# Patient Record
Sex: FEMALE | Race: WHITE | NOT HISPANIC OR LATINO | Employment: FULL TIME | URBAN - METROPOLITAN AREA
[De-identification: names, ages, dates, MRNs, and addresses within clinical notes are randomized per-mention and may not be internally consistent; named-entity substitution may affect disease eponyms.]

---

## 2017-12-18 ENCOUNTER — HOSPITAL ENCOUNTER (OUTPATIENT)
Dept: RADIOLOGY | Facility: MEDICAL CENTER | Age: 47
End: 2017-12-18
Attending: GENERAL PRACTICE
Payer: COMMERCIAL

## 2017-12-18 DIAGNOSIS — M79.645 PAIN OF FINGER OF LEFT HAND: ICD-10-CM

## 2017-12-18 PROCEDURE — 73140 X-RAY EXAM OF FINGER(S): CPT | Mod: LT

## 2018-07-13 ENCOUNTER — OFFICE VISIT (OUTPATIENT)
Dept: MEDICAL GROUP | Facility: PHYSICIAN GROUP | Age: 48
End: 2018-07-13
Payer: COMMERCIAL

## 2018-07-13 VITALS
BODY MASS INDEX: 20.56 KG/M2 | SYSTOLIC BLOOD PRESSURE: 110 MMHG | OXYGEN SATURATION: 96 % | HEART RATE: 55 BPM | TEMPERATURE: 97.6 F | DIASTOLIC BLOOD PRESSURE: 72 MMHG | WEIGHT: 131 LBS | RESPIRATION RATE: 18 BRPM | HEIGHT: 67 IN

## 2018-07-13 DIAGNOSIS — Z12.11 SCREENING FOR COLON CANCER: ICD-10-CM

## 2018-07-13 DIAGNOSIS — I10 ESSENTIAL HYPERTENSION: ICD-10-CM

## 2018-07-13 DIAGNOSIS — G43.909 MIGRAINE WITHOUT STATUS MIGRAINOSUS, NOT INTRACTABLE, UNSPECIFIED MIGRAINE TYPE: ICD-10-CM

## 2018-07-13 DIAGNOSIS — F51.01 PRIMARY INSOMNIA: ICD-10-CM

## 2018-07-13 PROCEDURE — 99203 OFFICE O/P NEW LOW 30 MIN: CPT | Mod: 25 | Performed by: FAMILY MEDICINE

## 2018-07-13 RX ORDER — MEDROXYPROGESTERONE ACETATE 150 MG/ML
150 INJECTION, SUSPENSION INTRAMUSCULAR ONCE
Qty: 1 ML | Refills: 0
Start: 2018-07-13 | End: 2018-07-13

## 2018-07-13 RX ORDER — MEDROXYPROGESTERONE ACETATE 150 MG/ML
150 INJECTION, SUSPENSION INTRAMUSCULAR ONCE
Status: COMPLETED | OUTPATIENT
Start: 2018-07-13 | End: 2018-07-13

## 2018-07-13 RX ORDER — ELETRIPTAN HYDROBROMIDE 40 MG/1
40 TABLET, FILM COATED ORAL
COMMUNITY
End: 2018-09-10 | Stop reason: SDUPTHER

## 2018-07-13 RX ADMIN — MEDROXYPROGESTERONE ACETATE 150 MG: 150 INJECTION, SUSPENSION INTRAMUSCULAR at 16:45

## 2018-07-13 ASSESSMENT — PATIENT HEALTH QUESTIONNAIRE - PHQ9: CLINICAL INTERPRETATION OF PHQ2 SCORE: 0

## 2018-07-13 NOTE — ASSESSMENT & PLAN NOTE
Has had problems getting to sleep since adolescence. Has been on Xanax x years. Wiling to be switched to something else. Experienced severe withdrawal sxs when he pills were in her lost baggage.

## 2018-07-13 NOTE — PROGRESS NOTES
Complaint: Refill medication     Subjective:     Vanessa Mcdaniel is a 48 y.o. female here today for her Depo-Provera shot. Used to be followed by Dr. Morales Dudley.  Migraines  On Depo-Provera and propranolol to prevent migraines.     Primary insomnia  Has had problems getting to sleep since adolescence. Has been on Xanax x years. Wiling to be switched to something else. Experienced severe withdrawal sxs when he pills were in her lost baggage.    Essential hypertension  On propranolol.     Works as . Single. No children.    Current medicines (including changes today)  Current Outpatient Prescriptions   Medication Sig Dispense Refill   • eletriptan (RELPAX) 40 MG tablet Take 40 mg by mouth Once PRN.     • alprazolam (XANAX) 1 MG TABS Take 2 mg by mouth every bedtime.     • propranolol (INDERAL) 80 MG TABS Take 80 mg by mouth 2 Times a Day.     • MedroxyPROGESTERone Acetate (DEPO-PROVERA IM) by Intramuscular route.       No current facility-administered medications for this visit.      She  has a past medical history of Hypertension; Insomnia; and Migraine.    Health Maintenance: UTD according to patient.      Allergies: Ceclor [cefaclor]    Current Outpatient Prescriptions Ordered in [facility]   Medication Sig Dispense Refill   • eletriptan (RELPAX) 40 MG tablet Take 40 mg by mouth Once PRN.     • alprazolam (XANAX) 1 MG TABS Take 2 mg by mouth every bedtime.     • propranolol (INDERAL) 80 MG TABS Take 80 mg by mouth 2 Times a Day.     • MedroxyPROGESTERone Acetate (DEPO-PROVERA IM) by Intramuscular route.       No current [facility]-ordered facility-administered medications on file.        Past Medical History:   Diagnosis Date   • Hypertension    • Insomnia    • Migraine        Past Surgical History:   Procedure Laterality Date   • ACL REPAIR Right    • MAMMOPLASTY AUGMENTATION Bilateral        Social History   Substance Use Topics   • Smoking status: Never Smoker   • Smokeless tobacco: Never Used   • Alcohol use 1.2  "oz/week     2 Glasses of wine per week       Social History     Social History Narrative   • No narrative on file       History reviewed. No pertinent family history.      ROS  Patient denies any fever, chills, unintentional weight gain/loss, fatigue, stroke symptoms, dizziness, headache, nasal congestion, sore-throat, cough, heartburn, chest pain, difficulty breathing, abdominal discomfort, diarrhea/constipation, burning with urination or frequency, joint or back pain, skin rashes, depression or anxiety.       Objective:     Blood pressure 110/72, pulse (!) 55, temperature 36.4 °C (97.6 °F), resp. rate 18, height 1.702 m (5' 7\"), weight 59.4 kg (131 lb), SpO2 96 %. Body mass index is 20.52 kg/m².   Physical Exam:  Constitutional: Alert, no distress.  Neck: Trachea midline, no masses, no thyromegaly. No cervical or supraclavicular lymphadenopathy  Respiratory: Unlabored respiratory effort, lungs clear to auscultation, no wheezes, no ronchi.  Cardiovascular: Normal S1, S2, no murmur, no extremity edema.  Psych: Alert and oriented x3, appropriate affect and mood.        Assessment and Plan:   The following treatment plan was discussed    1. Migraine without status migrainosus, not intractable, unspecified migraine type  Chronic problem, controlled on meds.  - medroxyPROGESTERone (DEPO-PROVERA) injection 150 mg; 1 mL by Intramuscular route Once.    2. Primary insomnia  Chronic problem. Pt agreeable to switch to clonazepam once near out of Xanax. Will need to sign CS agreement and get US.    3. Essential hypertension  Chronic problem. Controlled on meds.    Followup: Return in about 3 months (around 10/13/2018).    Please note that this dictation was created using voice recognition software. I have made every reasonable attempt to correct obvious errors, but I expect that there are errors of grammar and possibly content that I did not discover before finalizing the note.           "

## 2018-08-03 ENCOUNTER — OFFICE VISIT (OUTPATIENT)
Dept: MEDICAL GROUP | Facility: PHYSICIAN GROUP | Age: 48
End: 2018-08-03
Payer: COMMERCIAL

## 2018-08-03 VITALS
DIASTOLIC BLOOD PRESSURE: 64 MMHG | RESPIRATION RATE: 18 BRPM | OXYGEN SATURATION: 97 % | HEART RATE: 53 BPM | HEIGHT: 66 IN | SYSTOLIC BLOOD PRESSURE: 98 MMHG | BODY MASS INDEX: 21.44 KG/M2 | WEIGHT: 133.4 LBS | TEMPERATURE: 98.2 F

## 2018-08-03 DIAGNOSIS — F51.01 PRIMARY INSOMNIA: ICD-10-CM

## 2018-08-03 PROCEDURE — 99213 OFFICE O/P EST LOW 20 MIN: CPT | Performed by: FAMILY MEDICINE

## 2018-08-03 RX ORDER — LORAZEPAM 1 MG/1
1 TABLET ORAL EVERY 4 HOURS PRN
Qty: 30 TAB | Refills: 0 | Status: SHIPPED | OUTPATIENT
Start: 2018-08-03 | End: 2018-08-23

## 2018-08-03 NOTE — ASSESSMENT & PLAN NOTE
Here for sleep med/Has had insomnia since age of 5. Used to wake up, have to read. Has tried number meds including Belsera, Ambien, etc... Concerned about being groggy in AM.

## 2018-08-03 NOTE — PROGRESS NOTES
Complaint: Sleep med     Subjective:     Vanessa Mcdaniel is a 48 y.o. female here today for refill sleep med.    Primary insomnia  Here for sleep med/Has had insomnia since age of 5. Used to wake up, have to read. Has tried number meds including Belsera, Ambien, etc... Concerned about being groggy in AM.      No other concerns or complaints.    Current medicines (including changes today)  Current Outpatient Prescriptions   Medication Sig Dispense Refill   • LORazepam (ATIVAN) 1 MG Tab Take 1 Tab by mouth every four hours as needed (for sleep) for up to 30 days. 30 Tab 0   • eletriptan (RELPAX) 40 MG tablet Take 40 mg by mouth Once PRN.     • propranolol (INDERAL) 80 MG TABS Take 80 mg by mouth 2 Times a Day.     • MedroxyPROGESTERone Acetate (DEPO-PROVERA IM) by Intramuscular route.       No current facility-administered medications for this visit.      She  has a past medical history of Hypertension; Insomnia; and Migraine.    Health Maintenance: UTD      Allergies: Ceclor [cefaclor]    Current Outpatient Prescriptions Ordered in [facility]   Medication Sig Dispense Refill   • LORazepam (ATIVAN) 1 MG Tab Take 1 Tab by mouth every four hours as needed (for sleep) for up to 30 days. 30 Tab 0   • eletriptan (RELPAX) 40 MG tablet Take 40 mg by mouth Once PRN.     • propranolol (INDERAL) 80 MG TABS Take 80 mg by mouth 2 Times a Day.     • MedroxyPROGESTERone Acetate (DEPO-PROVERA IM) by Intramuscular route.       No current [facility]-ordered facility-administered medications on file.        Past Medical History:   Diagnosis Date   • Hypertension    • Insomnia    • Migraine        Past Surgical History:   Procedure Laterality Date   • ACL REPAIR Right    • MAMMOPLASTY AUGMENTATION Bilateral        Social History   Substance Use Topics   • Smoking status: Never Smoker   • Smokeless tobacco: Never Used   • Alcohol use 1.2 oz/week     2 Glasses of wine per week       Social History     Social History Narrative   • No narrative on file  "      History reviewed. No pertinent family history.      ROS   Patient denies any fever, chills, unintentional weight gain/loss, fatigue, stroke symptoms, dizziness, headache, nasal congestion, sore-throat, cough, heartburn, chest pain, difficulty breathing, abdominal discomfort, diarrhea/constipation, burning with urination or frequency, joint or back pain, skin rashes, depression or anxiety.       Objective:     Blood pressure (!) 98/64, pulse (!) 53, temperature 36.8 °C (98.2 °F), resp. rate 18, height 1.676 m (5' 6\"), weight 60.5 kg (133 lb 6.4 oz), SpO2 97 %. Body mass index is 21.53 kg/m².   Physical Exam:  Constitutional: Alert, no distress.  No formal exam  Psych: Alert and oriented x3, appropriate affect and mood.        Assessment and Plan:   The following treatment plan was discussed    1. Primary insomnia  Chronic problem, controlled on med. Patient to report back on effect. If tolerated, will Rx for 3 months at a time.  - CONTROLLED SUBSTANCE TREATMENT AGREEMENT  - [facility] PAIN MANAGEMENT SCREEN; Future  - LORazepam (ATIVAN) 1 MG Tab; Take 1 Tab by mouth every four hours as needed (for sleep) for up to 30 days.  Dispense: 30 Tab; Refill: 0      Followup: Return in about 3 months (around 11/3/2018).    Please note that this dictation was created using voice recognition software. I have made every reasonable attempt to correct obvious errors, but I expect that there are errors of grammar and possibly content that I did not discover before finalizing the note.           "

## 2018-08-09 ENCOUNTER — TELEPHONE (OUTPATIENT)
Dept: MEDICAL GROUP | Facility: CLINIC | Age: 48
End: 2018-08-09

## 2018-08-09 DIAGNOSIS — F51.01 PRIMARY INSOMNIA: ICD-10-CM

## 2018-08-21 ENCOUNTER — TELEPHONE (OUTPATIENT)
Dept: MEDICAL GROUP | Facility: PHYSICIAN GROUP | Age: 48
End: 2018-08-21

## 2018-08-21 NOTE — TELEPHONE ENCOUNTER
1. Caller Name: Vanessa Mcdaniel                                           Call Back Number: [phone] (home)         Patient approves a detailed voicemail message: yes    patient called requesting to up the dosage of her lorazepam from 1 mg to 3.5 mg. She stated that the 1 mg is not helping. She is supposed to see Dr. Gonzalez tomorrow 08/22/18 at 9:20am but decided to cancel her appointment. She stated that she just needs her medication to be increased.

## 2018-08-23 ENCOUNTER — OFFICE VISIT (OUTPATIENT)
Dept: MEDICAL GROUP | Facility: LAB | Age: 48
End: 2018-08-23
Payer: COMMERCIAL

## 2018-08-23 VITALS
WEIGHT: 133.6 LBS | RESPIRATION RATE: 18 BRPM | BODY MASS INDEX: 21.47 KG/M2 | HEIGHT: 66 IN | SYSTOLIC BLOOD PRESSURE: 112 MMHG | OXYGEN SATURATION: 96 % | TEMPERATURE: 98.4 F | HEART RATE: 68 BPM | DIASTOLIC BLOOD PRESSURE: 64 MMHG

## 2018-08-23 DIAGNOSIS — F51.01 PRIMARY INSOMNIA: ICD-10-CM

## 2018-08-23 PROCEDURE — 99203 OFFICE O/P NEW LOW 30 MIN: CPT | Performed by: INTERNAL MEDICINE

## 2018-08-23 RX ORDER — TRAZODONE HYDROCHLORIDE 50 MG/1
50 TABLET ORAL NIGHTLY PRN
Qty: 30 TAB | Refills: 3 | Status: SHIPPED | OUTPATIENT
Start: 2018-08-23 | End: 2018-08-23

## 2018-08-23 RX ORDER — TRAZODONE HYDROCHLORIDE 50 MG/1
50 TABLET ORAL NIGHTLY PRN
Qty: 30 TAB | Refills: 3 | Status: SHIPPED | OUTPATIENT
Start: 2018-08-23 | End: 2018-09-10

## 2018-08-23 RX ORDER — ALPRAZOLAM 1 MG/1
1 TABLET ORAL NIGHTLY PRN
Qty: 15 TAB | Refills: 0 | Status: SHIPPED | OUTPATIENT
Start: 2018-08-23 | End: 2018-09-10 | Stop reason: SDUPTHER

## 2018-08-23 NOTE — PROGRESS NOTES
Chief Complaint   Patient presents with   • Medication Refill     lorazepam from increasing the dose to 3.5 mg       Subjective:     History of Present Illness: Patient is a 48 y.o. female. This is a patient of Dr. Brady who is here today for an acute visit for insomnia.    Primary insomnia  New to me, chronic uncontrolled problem.  She told me that she has insomnia since she was 5 years old.  She usually goes to bed at 9 PM, tried to read on her denis is very boring book and then she will fall asleep and wakes up at 2 in the morning.  She has been taking lorazepam for 4 years and recently switched to Ativan 1 mg earlier this month.  She stated that she usually exercises in the evening by walking her dog, she cannot do relaxation or read books instead of electronics at that bedtime because she cannot go and turn the lights off.  She seems to be very frustrated for her insomnia because she has tried multiple medications in the past.  She has been happy with the level of control of her symptoms on Xanax but was recently switched to Ativan because of long-term benzodiazepine and plan to wean her off.  She told me that since she stopped taking Xanax for anxiety level has also increased.  I asked her if she has anxiety problems she denied at this time but apparently she does have an underlying anxiety issue that occurs above Xanax as well.  Upon further questioning, she stated that she try trazodone about 10 years ago but she cannot remember why she stopped it and what side effects she had from it.  She is willing to try it again.              Allergies: Ceclor [cefaclor]    Current Outpatient Prescriptions Ordered in [facility]   Medication Sig Dispense Refill   • ALPRAZolam (XANAX) 1 MG Tab Take 1 Tab by mouth at bedtime as needed for Sleep for up to 30 days. 15 Tab 0   • traZODone (DESYREL) 50 MG Tab Take 1 Tab by mouth at bedtime as needed for Sleep. 30 Tab 3   • eletriptan (RELPAX) 40 MG tablet Take 40 mg by mouth  Once PRN.     • propranolol (INDERAL) 80 MG TABS Take 80 mg by mouth 2 Times a Day.     • MedroxyPROGESTERone Acetate (DEPO-PROVERA IM) by Intramuscular route.       No current [facility]-ordered facility-administered medications on file.        Past Medical History:   Diagnosis Date   • Hypertension    • Insomnia    • Migraine        Past Surgical History:   Procedure Laterality Date   • ACL REPAIR Right    • MAMMOPLASTY AUGMENTATION Bilateral        Social History   Substance Use Topics   • Smoking status: Never Smoker   • Smokeless tobacco: Never Used   • Alcohol use 1.2 oz/week     2 Glasses of wine per week       History reviewed. No pertinent family history.    ROS:     - Constitutional: Negative for fever, chills, unexpected weight change, and fatigue/generalized weakness.     - HEENT: Negative for headaches, vision changes, hearing changes, ear pain, ear discharge, sinus congestion, sore throat, and neck pain.      - Respiratory: Negative for cough, sputum production, dyspnea and wheezing.    - Cardiovascular: Negative for chest pain, palpitations, orthopnea, and bilateral lower extremity edema.     - Gastrointestinal: Negative for heartburn, nausea, vomiting, abdominal pain, hematochezia, melena, diarrhea, constipation, and greasy/foul-smelling stools.     - Genitourinary: Negative for dysuria, polyuria, hematuria, pyuria, urinary urgency, and urinary incontinence.    - Musculoskeletal: Negative for myalgias, back pain, and joint pain.     - Skin: Negative for rash, itching, cyanotic skin color change.     - Neurological: Negative for dizziness, tingling, tremors, focal sensory deficit, focal weakness and headaches.     - Endo/Heme/Allergies: Does not bruise/bleed easily.     - Psychiatric/Behavioral: + insomnia. No SI.      - NOTE: All other systems reviewed and are negative, except as in HPI.       Physical Exam:     Blood pressure 112/64, pulse 68, temperature 36.9 °C (98.4 °F), resp. rate 18, height 1.676 m  "(5' 6\"), weight 60.6 kg (133 lb 9.6 oz), SpO2 96 %, not currently breastfeeding. Body mass index is 21.56 kg/m².   General: Normal appearing. No distress.  HEENT: Normocephalic. Eyes conjunctiva clear lids without ptosis, pupils equal and reactive to light accommodation, ears normal shape and contour, canals are clear bilaterally, tympanic membranes are benign,  oropharynx is without erythema, edema or exudates.    Neck: Supple without JVD or bruit. Thyroid is not enlarged.  Pulmonary: Clear to ausculation.  Normal effort. No rales, ronchi, or wheezing.  Cardiovascular: Regular rate and rhythm without murmur. Radial pulses are intact, regular and symmetrical bilaterally.  Abdomen: Soft, nontender, nondistended. Normal bowel sounds. Liver and spleen are not palpable.  Neurologic: Grossly non-focal.  Lymph: No cervical, occipital or supraclavicular lymph nodes are palpable  Skin: Warm and dry.  No obvious lesions.  Musculoskeletal: Normal gait. No extremity cyanosis, clubbing, or edema.  Psych: Normal mood and affect. Alert and oriented x3. Judgment and insight is normal.    Assessment and Plan:     1. Primary insomnia  New to me, chronic uncontrolled problem.  Chronic primary insomnia with benzodiazepine dependence.  Discussed her sleep hygiene, she is not open to change on her hygiene.  Has failed multiple medications in the past.  Failed a recent trial of Ativan 1 mg daily and had to take 2 and half milligrams per days so she ran out of her prescription.  I discussed with her the long-term benzodiazepine side effects and dependence potential.  I encouraged her to try trazodone 50 mg daily, start to increase her dose gradually to a maximum of 150 to achieve good sleep.  Will wean down Xanax, will give prescription for 1 mg every other day, total of 15 pills.  She will follow-up with her primary office in 2 weeks for reevaluation.    - ALPRAZolam (XANAX) 1 MG Tab; Take 1 Tab by mouth at bedtime as needed for Sleep " for up to 30 days.  Dispense: 15 Tab; Refill: 0  - traZODone (DESYREL) 50 MG Tab; Take 1 Tab by mouth at bedtime as needed for Sleep.  Dispense: 30 Tab; Refill: 3    Follow Up:      Return in about 2 weeks (around 9/6/2018) for Follow-up with primary for insomnia.    Please note that this dictation was created using voice recognition software. I have made every reasonable attempt to correct obvious errors, but I expect that there are errors of grammar and possibly content that I did not discover before finalizing the note.    Signed by: Rayna Knight M.D.

## 2018-08-23 NOTE — ASSESSMENT & PLAN NOTE
New to me, chronic uncontrolled problem.  She told me that she has insomnia since she was 5 years old.  She usually goes to bed at 9 PM, tried to read on her denis is very boring book and then she will fall asleep and wakes up at 2 in the morning.  She has been taking lorazepam for 4 years and recently switched to Ativan 1 mg earlier this month.  She stated that she usually exercises in the evening by walking her dog, she cannot do relaxation or read books instead of electronics at that bedtime because she cannot go and turn the lights off.  She seems to be very frustrated for her insomnia because she has tried multiple medications in the past.  She has been happy with the level of control of her symptoms on Xanax but was recently switched to Ativan because of long-term benzodiazepine and plan to wean her off.  She told me that since she stopped taking Xanax for anxiety level has also increased.  I asked her if she has anxiety problems she denied at this time but apparently she does have an underlying anxiety issue that occurs above Xanax as well.  Upon further questioning, she stated that she try trazodone about 10 years ago but she cannot remember why she stopped it and what side effects she had from it.  She is willing to try it again.

## 2018-09-10 ENCOUNTER — OFFICE VISIT (OUTPATIENT)
Dept: MEDICAL GROUP | Facility: PHYSICIAN GROUP | Age: 48
End: 2018-09-10
Payer: COMMERCIAL

## 2018-09-10 VITALS
HEART RATE: 55 BPM | HEIGHT: 66 IN | WEIGHT: 133 LBS | BODY MASS INDEX: 21.38 KG/M2 | RESPIRATION RATE: 14 BRPM | DIASTOLIC BLOOD PRESSURE: 82 MMHG | SYSTOLIC BLOOD PRESSURE: 118 MMHG | TEMPERATURE: 99.5 F | OXYGEN SATURATION: 96 %

## 2018-09-10 DIAGNOSIS — G43.909 MIGRAINE WITHOUT STATUS MIGRAINOSUS, NOT INTRACTABLE, UNSPECIFIED MIGRAINE TYPE: ICD-10-CM

## 2018-09-10 DIAGNOSIS — I10 ESSENTIAL HYPERTENSION: ICD-10-CM

## 2018-09-10 DIAGNOSIS — F51.01 PRIMARY INSOMNIA: ICD-10-CM

## 2018-09-10 PROCEDURE — 99214 OFFICE O/P EST MOD 30 MIN: CPT | Performed by: FAMILY MEDICINE

## 2018-09-10 RX ORDER — ALPRAZOLAM 1 MG/1
1 TABLET ORAL NIGHTLY PRN
Qty: 30 TAB | Refills: 0 | Status: SHIPPED | OUTPATIENT
Start: 2018-09-10 | End: 2018-09-10 | Stop reason: SDUPTHER

## 2018-09-10 RX ORDER — ELETRIPTAN HYDROBROMIDE 40 MG/1
40 TABLET, FILM COATED ORAL
Qty: 8 TAB | Refills: 2 | Status: SHIPPED | OUTPATIENT
Start: 2018-09-10 | End: 2018-10-10

## 2018-09-10 RX ORDER — ALPRAZOLAM 1 MG/1
1 TABLET ORAL NIGHTLY PRN
Qty: 30 TAB | Refills: 0 | Status: SHIPPED | OUTPATIENT
Start: 2018-10-10 | End: 2018-09-10 | Stop reason: SDUPTHER

## 2018-09-10 RX ORDER — ALPRAZOLAM 1 MG/1
1 TABLET ORAL NIGHTLY PRN
Qty: 30 TAB | Refills: 0 | Status: SHIPPED | OUTPATIENT
Start: 2018-11-09 | End: 2018-12-09

## 2018-09-10 NOTE — LETTER
[facility]  Tabatha Gonzalez M.D.  [address]  [facility] [address]  Fax: [phone]   Authorization for Release/Disclosure of   Protected Health Information   Name: VANESSA MCDANIEL : 1970 SSN: [id number]   Address: [address] [place] [address] [address] [place] [address] Phone:    [phone] (home)    I authorize the entity listed below to release/disclose the PHI below to:   [facility]/Tabatha Gonzalez M.D. and Tabatha Gonzalez M.D.   Provider or Entity Name:     Address   City, State, [facility]   Phone:      Fax:     Reason for request: continuity of care   Information to be released:    [  ] LAST COLONOSCOPY,  including any PATH REPORT and follow-up  [  ] LAST FIT/COLOGUARD RESULT [  ] LAST DEXA  [  ] LAST MAMMOGRAM  [  ] LAST PAP  [  ] LAST LABS [  ] RETINA EXAM REPORT  [  ] IMMUNIZATION RECORDS  [  ] Release all info      [  ] Check here and initial the line next to each item to release ALL health information INCLUDING  _____ Care and treatment for drug and / or alcohol abuse  _____ HIV testing, infection status, or AIDS  _____ Genetic Testing    DATES OF SERVICE OR TIME PERIOD TO BE DISCLOSED: _____________  I understand and acknowledge that:  * This Authorization may be revoked at any time by you in writing, except if your health information has already been used or disclosed.  * Your health information that will be used or disclosed as a result of you signing this authorization could be re-disclosed by the recipient. If this occurs, your re-disclosed health information may no longer be protected by State or Federal laws.  * You may refuse to sign this Authorization. Your refusal will not affect your ability to obtain treatment.  * This Authorization becomes effective upon signing and will  on (date) __________.      If no date is indicated, this Authorization will  one (1) year from the signature date.    Name: Vanessa Mcdaniel    Signature:   Date:     9/10/2018       PLEASE FAX  REQUESTED RECORDS BACK TO: [phone]

## 2018-09-10 NOTE — ASSESSMENT & PLAN NOTE
She was previously treated with botox injections by Dr. Dudley's office who is no longer her PCP.  She has food sensitivities and exercise induced migraines.  Botox cause migraines to resolve for 6 months.  Headaches have returned but respond to eletriptan which she needs refills of.  She is also on depo-provera which she has been on for many years.  She wishes to continue taking for now and is aware of the risk of osteoporosis.

## 2018-09-10 NOTE — ASSESSMENT & PLAN NOTE
She reports she has had insomnia since age 5.  She was started on Xanax 1 mg for 3-4 years and is the only medication that helps with sleep but does not make her drowsy during the days.  Trazodone, ambien, halcyon, lorazepam and all made her drowsy during the day and she could not sleep.  She was recently switched to lorazepam and did not do well on that and was started on trazodone.  She reports that she has never been diagnosed with anxiety.  She was seen by a sleep therapist and reports that the therapist said that only xanax will work for her.  She is set in her current sleep schedule and states that she cannot sleep without watching television on her tablet.

## 2018-09-11 NOTE — PROGRESS NOTES
CC: insomnia, headaches.    HISTORY OF PRESENT ILLNESS: Patient is a 48 y.o. female established patient who presents today to discuss the following    Health Maintenance: reviewed, flu declined    Primary insomnia  She reports she has had insomnia since age 5.  She was started on Xanax 1 mg for 3-4 years and is the only medication that helps with sleep but does not make her drowsy during the days.  Trazodone, ambien, halcyon, lorazepam and all made her drowsy during the day and she could not sleep.  She was recently switched to lorazepam and did not do well on that and was started on trazodone.  She reports that she has never been diagnosed with anxiety.  She was seen by a sleep therapist and reports that the therapist said that only xanax will work for her.  She is set in her current sleep schedule and states that she cannot sleep without watching television on her tablet.      Migraines  She was previously treated with botox injections by Dr. Dudley's office who is no longer her PCP.  She has food sensitivities and exercise induced migraines.  Botox cause migraines to resolve for 6 months.  Headaches have returned but respond to eletriptan which she needs refills of.  She is also on depo-provera which she has been on for many years.  She wishes to continue taking for now and is aware of the risk of osteoporosis.    Essential hypertension  This is managed on propranolol.  Her BP has been controlled over her last 3 visits.      Patient Active Problem List    Diagnosis Date Noted   • Migraines 07/13/2018   • Primary insomnia 07/13/2018   • Essential hypertension 07/13/2018      Allergies:Ceclor [cefaclor]    Current Outpatient Prescriptions   Medication Sig Dispense Refill   • eletriptan (RELPAX) 40 MG tablet Take 1 Tab by mouth Once PRN for up to 30 days. 8 Tab 2   • [START ON 11/9/2018] ALPRAZolam (XANAX) 1 MG Tab Take 1 Tab by mouth at bedtime as needed for Sleep for up to 30 days. 30 Tab 0   • propranolol  "(INDERAL) 80 MG TABS Take 80 mg by mouth 2 Times a Day.     • MedroxyPROGESTERone Acetate (DEPO-PROVERA IM) by Intramuscular route.       No current facility-administered medications for this visit.        Social History   Substance Use Topics   • Smoking status: Never Smoker   • Smokeless tobacco: Never Used   • Alcohol use 1.2 oz/week     2 Glasses of wine per week     Social History     Social History Narrative   • No narrative on file       Family History   Problem Relation Age of Onset   • Depression Mother        Review of Systems:      - Constitutional: Negative for fever, chills, unexpected weight change, and fatigue/generalized weakness.     - Neurological: Negative for dizziness, tingling, tremors, focal sensory deficit, focal weakness.     - Psychiatric/Behavioral: Negative for depression, suicidal/homicidal ideation and memory loss.          Exam:    Blood pressure 118/82, pulse (!) 55, temperature 37.5 °C (99.5 °F), resp. rate 14, height 1.676 m (5' 6\"), weight 60.3 kg (133 lb), SpO2 96 %. Body mass index is 21.47 kg/m².    General:  Well nourished, well developed female in NAD  Head is grossly normal.  Neck: Supple without JVD or bruit. Thyroid is not enlarged.  Pulmonary: Clear to ausculation and percussion.  Normal effort. No rales, ronchi, or wheezing.  Cardiovascular: Regular rate and rhythm without murmur. Carotid and radial pulses are intact and equal bilaterally.  Extremities: no clubbing, cyanosis, or edema.  Neuro: Normal Exam, Cranial nerves 2-12 intact, strength intact all four extremities, sensation intact to soft touch all four extremities, patellar and biceps brachii reflexes 2+ bilaterally, gait normal, Finger-to-nose is symmetric      Please note that this dictation was created using voice recognition software. I have made every reasonable attempt to correct obvious errors, but I expect that there are errors of grammar and possibly content that I did not discover before finalizing the " note.    Assessment/Plan:  1. Primary insomnia  She has long standing insomnia and uses xanax for sleep induction.  She has been using this for a few years and has not been able to wean it.  She feels somnolent on other medications the next day and is resistant to ongoing use of them.  She wishes to remain on Xanax.  The risks of xanax were reviewed, particularly the increased risk of dependence.  It is not a common insomnia medication.  I do suspect a degree of underlying anxiety.  For this reason she will need at least an evaluation with a specialist and likely ongoing follow up with them to manage this medication if it is indeed necessary. The records from the sleep therapist were requested and will need to be on file in the future.  Obtained and reviewed her patient utilization report from the [facility] database on 09/10/18 prior to writing prescriptions for controlled substances II, III, or IV per [place] bill .  A consent for the prescription of a controlled substance was signed.  Ongoing use of the medication is medically necessary at this time due to sleep, ongoing prescription will need to be by a specialist.    - ALPRAZolam (XANAX) 1 MG Tab; Take 1 Tab by mouth at bedtime as needed for Sleep for up to 30 days.  Dispense: 30 Tab; Refill: 0  - REFERRAL TO PSYCHIATRY  - Controlled Substance Treatment Agreement    2. Migraine without status migrainosus, not intractable, unspecified migraine type  She may continue to use relpax as needed, she is not overusing it.  She may see neurology to discuss botox.  She had a provider she would like to see and will let me know if a referral is needed and where she would like it sent.  - eletriptan (RELPAX) 40 MG tablet; Take 1 Tab by mouth Once PRN for up to 30 days.  Dispense: 8 Tab; Refill: 2    3. Essential hypertension  This is well controlled, continue current medications.

## 2018-09-14 ENCOUNTER — TELEPHONE (OUTPATIENT)
Dept: MEDICAL GROUP | Facility: PHYSICIAN GROUP | Age: 48
End: 2018-09-14

## 2018-09-14 NOTE — TELEPHONE ENCOUNTER
1. Caller Name: Vanessa                                       Call Back Number: [phone]      Patient approves a detailed voicemail message: yes    Medication issue:    There was another prescriber that prescribed trazadone to wing the patient off of the xanax. The pharmacy has flagged this medication, so they are not wanting to refill the Xanax for the patient due to the way the other provider has documented the medication he prescribed.    I am unable to pull an , but advised the patient I would contact the pharmacist. If need be.

## 2018-09-14 NOTE — TELEPHONE ENCOUNTER
On review of the , the last provider gave her 15 tabs of Xanax to last 30 days, this is probably why she can't fill it.  I am ok with the early refill this time if the pharmacy is able to bypass that.  Again, she will need a specialist to take over prescribing in the future.

## 2018-10-08 ENCOUNTER — NON-PROVIDER VISIT (OUTPATIENT)
Dept: MEDICAL GROUP | Facility: PHYSICIAN GROUP | Age: 48
End: 2018-10-08
Payer: COMMERCIAL

## 2018-10-08 DIAGNOSIS — F51.01 PRIMARY INSOMNIA: ICD-10-CM

## 2018-10-08 DIAGNOSIS — Z30.42 ENCOUNTER FOR SURVEILLANCE OF INJECTABLE CONTRACEPTIVE: ICD-10-CM

## 2018-10-08 PROBLEM — Z30.40 ENCOUNTER FOR SURVEILLANCE OF CONTRACEPTIVES: Status: ACTIVE | Noted: 2018-10-08

## 2018-10-08 PROCEDURE — 96372 THER/PROPH/DIAG INJ SC/IM: CPT | Performed by: FAMILY MEDICINE

## 2018-10-08 RX ORDER — MEDROXYPROGESTERONE ACETATE 150 MG/ML
150 INJECTION, SUSPENSION INTRAMUSCULAR ONCE
Status: COMPLETED | OUTPATIENT
Start: 2018-10-08 | End: 2018-10-08

## 2018-10-08 RX ADMIN — MEDROXYPROGESTERONE ACETATE 150 MG: 150 INJECTION, SUSPENSION INTRAMUSCULAR at 11:48

## 2018-10-08 NOTE — PROGRESS NOTES
Vanessa Mcdaniel is a 48 y.o. female here for a non-provider visit for Depo injection.    Reason for injection: Menapause  Order in MAR?: Yes  Patient supplied?:No  Minimum interval has been met for this injection (per MAR order): Yes    Order and dose verified by: Dr. Gonzalez  Patient tolerated injection and no adverse effects were observed or reported: No    # of Administrations remaining in MAR: 0

## 2018-10-16 DIAGNOSIS — G43.909 MIGRAINE WITHOUT STATUS MIGRAINOSUS, NOT INTRACTABLE, UNSPECIFIED MIGRAINE TYPE: ICD-10-CM

## 2018-10-16 NOTE — TELEPHONE ENCOUNTER
Was the patient seen in the last year in this department? Yes    Does patient have an active prescription for medications requested? Yes    Received Request Via: Patient        Last visit: 9/10/18  Last labs:10/1/2009

## 2018-10-17 RX ORDER — PROPRANOLOL HYDROCHLORIDE 80 MG/1
80 TABLET ORAL 2 TIMES DAILY
Qty: 180 TAB | Refills: 0 | Status: SHIPPED | OUTPATIENT
Start: 2018-10-17 | End: 2019-01-14 | Stop reason: SDUPTHER

## 2018-10-25 DIAGNOSIS — F51.01 PRIMARY INSOMNIA: ICD-10-CM

## 2019-02-25 ENCOUNTER — TELEPHONE (OUTPATIENT)
Dept: MEDICAL GROUP | Facility: PHYSICIAN GROUP | Age: 49
End: 2019-02-25

## 2019-02-26 NOTE — TELEPHONE ENCOUNTER
Pt called and left vm regarding the needing of rescheduling her depo injection. I called the patient and lvm advising the patient to call scheduling and schedule on Thurs afternoon or all day Friday.

## 2019-03-01 ENCOUNTER — NON-PROVIDER VISIT (OUTPATIENT)
Dept: MEDICAL GROUP | Facility: PHYSICIAN GROUP | Age: 49
End: 2019-03-01
Payer: COMMERCIAL

## 2019-03-01 DIAGNOSIS — Z30.9 ENCOUNTER FOR CONTRACEPTIVE MANAGEMENT, UNSPECIFIED TYPE: Primary | ICD-10-CM

## 2019-03-01 PROCEDURE — 96372 THER/PROPH/DIAG INJ SC/IM: CPT | Performed by: INTERNAL MEDICINE

## 2019-03-01 RX ORDER — MEDROXYPROGESTERONE ACETATE 150 MG/ML
150 INJECTION, SUSPENSION INTRAMUSCULAR ONCE
Status: COMPLETED | OUTPATIENT
Start: 2019-03-01 | End: 2019-03-01

## 2019-03-01 RX ADMIN — MEDROXYPROGESTERONE ACETATE 150 MG: 150 INJECTION, SUSPENSION INTRAMUSCULAR at 09:15

## 2019-03-01 NOTE — PROGRESS NOTES
Vanessa Mcdaniel is a 48 y.o. female here for a non-provider visit for Depo-Provera injection.    Reason for injection: Menopause   Order in MAR?: Yes  Patient supplied?:No  Minimum interval has been met for this injection (per MAR order): Yes    Order and dose verified by: Dr. Corley  Patient tolerated injection and no adverse effects were observed or reported: Yes

## 2019-03-08 ENCOUNTER — TELEPHONE (OUTPATIENT)
Dept: URGENT CARE | Facility: MEDICAL CENTER | Age: 49
End: 2019-03-08

## 2019-03-08 ENCOUNTER — OFFICE VISIT (OUTPATIENT)
Dept: URGENT CARE | Facility: MEDICAL CENTER | Age: 49
End: 2019-03-08
Payer: COMMERCIAL

## 2019-03-08 ENCOUNTER — HOSPITAL ENCOUNTER (OUTPATIENT)
Dept: RADIOLOGY | Facility: MEDICAL CENTER | Age: 49
End: 2019-03-08
Attending: NURSE PRACTITIONER
Payer: COMMERCIAL

## 2019-03-08 VITALS
HEART RATE: 54 BPM | TEMPERATURE: 99.4 F | DIASTOLIC BLOOD PRESSURE: 82 MMHG | SYSTOLIC BLOOD PRESSURE: 140 MMHG | HEIGHT: 66 IN | OXYGEN SATURATION: 97 % | BODY MASS INDEX: 20.73 KG/M2 | WEIGHT: 129 LBS

## 2019-03-08 DIAGNOSIS — R42 LIGHT HEADEDNESS: ICD-10-CM

## 2019-03-08 DIAGNOSIS — R00.1 BRADYCARDIA: ICD-10-CM

## 2019-03-08 DIAGNOSIS — R51.9 HEADACHE, UNSPECIFIED HEADACHE TYPE: ICD-10-CM

## 2019-03-08 DIAGNOSIS — G44.52 NEW DAILY PERSISTENT HEADACHE: ICD-10-CM

## 2019-03-08 PROCEDURE — 93000 ELECTROCARDIOGRAM COMPLETE: CPT | Performed by: NURSE PRACTITIONER

## 2019-03-08 PROCEDURE — 99214 OFFICE O/P EST MOD 30 MIN: CPT | Performed by: NURSE PRACTITIONER

## 2019-03-08 PROCEDURE — 70450 CT HEAD/BRAIN W/O DYE: CPT

## 2019-03-08 RX ORDER — ALPRAZOLAM 1 MG/1
TABLET ORAL
Refills: 0 | COMMUNITY
Start: 2019-02-04 | End: 2019-12-30

## 2019-03-08 RX ORDER — MINOCYCLINE HYDROCHLORIDE 100 MG/1
100 CAPSULE ORAL
Refills: 1 | COMMUNITY
Start: 2019-02-02 | End: 2024-02-15

## 2019-03-08 RX ORDER — LAMOTRIGINE 25 MG/1
TABLET ORAL
Refills: 0 | COMMUNITY
Start: 2019-01-15 | End: 2019-09-17

## 2019-03-08 ASSESSMENT — ENCOUNTER SYMPTOMS
FEVER: 0
CHILLS: 0
HEADACHES: 1
DIZZINESS: 1

## 2019-03-08 NOTE — PROGRESS NOTES
Subjective:      Vanessa Mcdaniel is a 48 y.o. female who presents with Blood Sugar Problem (153/93 / headache/ diziness X2 days)    Past Medical History:   Diagnosis Date   • Hypertension    • Insomnia    • Migraine      Social History     Social History   • Marital status: Single     Spouse name: N/A   • Number of children: N/A   • Years of education: N/A     Occupational History   • Not on file.     Social History Main Topics   • Smoking status: Never Smoker   • Smokeless tobacco: Never Used   • Alcohol use 1.2 oz/week     2 Glasses of wine per week   • Drug use: No   • Sexual activity: No     Other Topics Concern   • Not on file     Social History Narrative   • No narrative on file     Family History   Problem Relation Age of Onset   • Depression Mother        Allergies: Ceclor [cefaclor] and Doxycycline    Patient is 48-year-old female who presents today with complaint of vertex and frontal headache, and elevated blood pressure over the last week.  Patient has had a long-standing history of hypertension and states she has been taking beta-blocker propranolol 80 mg twice a day for the last 20 years.  States she has been very stable with this medication until recently.  No over-the-counter cough or cold medications.  Patient states she does wake up at night with headaches and states that last night she became very lightheaded.  That feeling has persisted today.  She denies chest pain or shortness of breath.          Other   The current episode started today. The problem occurs constantly. The problem has been unchanged. Associated symptoms include headaches. Pertinent negatives include no chills or fever. Nothing aggravates the symptoms. She has tried nothing for the symptoms. The treatment provided no relief.       Review of Systems   Constitutional: Positive for malaise/fatigue. Negative for chills and fever.   HENT: Negative.    Skin: Negative.    Neurological: Positive for dizziness and headaches.   All other  "systems reviewed and are negative.         Objective:     /82   Pulse (!) 54   Temp 37.4 °C (99.4 °F) (Temporal)   Ht 1.676 m (5' 6\")   Wt 58.5 kg (129 lb)   SpO2 97%   BMI 20.82 kg/m²      Physical Exam   Constitutional: She is oriented to person, place, and time. She appears well-developed and well-nourished.   HENT:   Head: Normocephalic.   Right Ear: External ear normal.   Left Ear: External ear normal.   Nose: Nose normal.   Mouth/Throat: Oropharynx is clear and moist.   Eyes: Pupils are equal, round, and reactive to light. Conjunctivae and EOM are normal.   Neck: Normal range of motion. Neck supple.   Cardiovascular: Normal rate, regular rhythm and normal heart sounds.    Pulmonary/Chest: Effort normal and breath sounds normal.   Musculoskeletal: Normal range of motion.   Neurological: She is alert and oriented to person, place, and time. She has normal strength. No cranial nerve deficit or sensory deficit. She displays a negative Romberg sign. GCS eye subscore is 4. GCS verbal subscore is 5. GCS motor subscore is 6.   Cranial nerves II through XII intact.  Cerebellar function intact.  Motor coordination intact.  Pupils equally round and reactive, EOMs intact.  Facial features symmetric with equal movement.  Speech is clear logical.  Shoulder shrug equal bilaterally.  Strength is 5/5 and equal in the upper and lower extremities.  Proprioception intact.  Romberg negative, no pronator drift.  Sensation intact.  Gait is even and steady.   Skin: Skin is warm and dry. Capillary refill takes less than 2 seconds.   Psychiatric: She has a normal mood and affect. Her behavior is normal. Judgment and thought content normal.   Vitals reviewed.    EKG: sinus bradycardia; no ST elevation or depression. Rate 45-47    CT head: pending     At this time I am concerned that patient's lightheadedness may be due to her low heart rate.  I did discuss going to ER with her as I am limited as to the workup I can do here " in the urgent care.  Patient declined to go to ER at this time.  We will refer patient to cardiology and obtain head CT because of the headache, with the understanding that she will go to ER for persistent or worsening of symptoms.  Patient verbalized understanding and agreement with plan of care.     Assessment/Plan:   Bradycardia  Light headedness    Referral to cardiology  Strict ER precautions for persistent symptoms  Head CT , will call results.  There are no diagnoses linked to this encounter.

## 2019-03-09 NOTE — TELEPHONE ENCOUNTER
Patient notified by phone of results of head CT.  CT scan is negative.  Reiterated ER precautions for continued or worsening lightheadedness.  Patient verbalized understanding and agreement, no further questions at this time.  Cardiology referral has been placed.

## 2019-03-11 ENCOUNTER — OFFICE VISIT (OUTPATIENT)
Dept: CARDIOLOGY | Facility: MEDICAL CENTER | Age: 49
End: 2019-03-11
Payer: COMMERCIAL

## 2019-03-11 VITALS
HEART RATE: 56 BPM | WEIGHT: 129 LBS | BODY MASS INDEX: 20.25 KG/M2 | DIASTOLIC BLOOD PRESSURE: 90 MMHG | SYSTOLIC BLOOD PRESSURE: 140 MMHG | HEIGHT: 67 IN

## 2019-03-11 DIAGNOSIS — R42 LIGHTHEADEDNESS: ICD-10-CM

## 2019-03-11 DIAGNOSIS — I10 ESSENTIAL HYPERTENSION: ICD-10-CM

## 2019-03-11 PROCEDURE — 99203 OFFICE O/P NEW LOW 30 MIN: CPT | Performed by: INTERNAL MEDICINE

## 2019-03-11 RX ORDER — ELETRIPTAN HYDROBROMIDE 40 MG/1
TABLET, FILM COATED ORAL
Refills: 3 | COMMUNITY
Start: 2019-02-21 | End: 2019-04-12 | Stop reason: SDUPTHER

## 2019-03-11 RX ORDER — CLINDAMYCIN HYDROCHLORIDE 300 MG/1
CAPSULE ORAL
Refills: 0 | COMMUNITY
Start: 2019-02-18 | End: 2019-09-17

## 2019-03-11 RX ORDER — LAMOTRIGINE 100 MG/1
100 TABLET ORAL
Refills: 1 | COMMUNITY
Start: 2019-02-21 | End: 2019-09-17

## 2019-03-11 NOTE — PROGRESS NOTES
Chief Complaint   Patient presents with   • HTN (Controlled)       Subjective:   Vanessa Mcdaniel is a 48 y.o. female who presents today for evaluation of a clinical syndrome that occurred for 3 days last week that included lightheadedness and a sensation that her heart pounding throughout her body.  She has a history of hypertension and migraine headaches which have been treated with propranolol 80 mg twice daily for 15 years.  She feels well with this medication except for the few days last week.  Her blood pressure typically runs about 140/90 at home.  At the time of her current symptoms, the blood pressure is 150/90 which she feels is very high.  Her pulse rate is usually around 60 but at the time of her EKG done on 3/8/2019, her heart rate was 45.  EKG was otherwise unremarkable with poor R wave progression in V1 and V2.  The patient is not had chest pain orthopnea or PND.  He is a non-smoker, light alcohol drinker and does not use salt.  She walks her dogs 3 times a week and swims a couple of times a month which she usually does without issues.  She recently checked her blood pressure before she went for a walk and it was 140/80    Past Medical History:   Diagnosis Date   • Hypertension    • Insomnia    • Migraine      Past Surgical History:   Procedure Laterality Date   • ACL REPAIR Right    • MAMMOPLASTY AUGMENTATION Bilateral      Family History   Problem Relation Age of Onset   • Depression Mother      Social History     Social History   • Marital status: Single     Spouse name: N/A   • Number of children: N/A   • Years of education: N/A     Occupational History   • Not on file.     Social History Main Topics   • Smoking status: Never Smoker   • Smokeless tobacco: Never Used   • Alcohol use 1.2 oz/week     2 Glasses of wine per week   • Drug use: No   • Sexual activity: No     Other Topics Concern   • Not on file     Social History Narrative   • No narrative on file     Allergies   Allergen Reactions   •  "Ceclor [Cefaclor] Swelling   • Doxycycline      Feels \"hungover\"     Outpatient Encounter Prescriptions as of 3/11/2019   Medication Sig Dispense Refill   • lamoTRIgine (LAMICTAL) 25 MG Tab TAKE TWO TABS BY MOUTH DAILY  0   • ALPRAZolam (XANAX) 1 MG Tab TAKE ONE AND A HALF TO TWO TABS BY MOUTH AT BEDTIME AS NEEDED FOR INSOMNIA (F51.01)  0   • minocycline (MINOCIN) 100 MG Cap Take 100 mg by mouth every day.  1   • propranolol (INDERAL) 80 MG Tab TAKE 1 TAB BY MOUTH 2 TIMES A DAY FOR 90 DAYS. 180 Tab 0   • MedroxyPROGESTERone Acetate (DEPO-PROVERA IM) by Intramuscular route.     • clindamycin (CLEOCIN) 300 MG Cap TAKE 1 CAPSULE BY MOUTH TWICE A DAY FOR 5 DAYS  0   • eletriptan (RELPAX) 40 MG tablet TAKE 1 TABLET BY MOUTH ONCE AS NEEDED FOR UP TO 30 DAYS  3   • lamoTRIgine (LAMICTAL) 100 MG Tab Take 100 mg by mouth every day.  1     No facility-administered encounter medications on file as of 3/11/2019.      ROS.see HPI     Objective:   /90 (BP Location: Left arm, Patient Position: Sitting, BP Cuff Size: Adult)   Pulse (!) 56   Ht 1.702 m (5' 7\")   Wt 58.5 kg (129 lb)   BMI 20.20 kg/m²     Physical Exam   Exam:    Vitals:    03/11/19 1242   BP: 140/90   BP Location: Left arm   Patient Position: Sitting   BP Cuff Size: Adult   Pulse: (!) 56   Weight: 58.5 kg (129 lb)   Height: 1.702 m (5' 7\")     Constitutional: Well developed, well nourished lady in no acute distress. Appears approximate stated age and provides a good history.  Repeat blood pressure 136/84 left arm sitting 122/80 right arm sitting  HEENT: Normocephalic, pupils are equal and responsive. No arcus. Conjunctiva and sclera are clear. No xanthelasma. No diagonal ear lobe crease noted. Mucus membranes are moist and pink. Good oral hygiene.  Normal palatal arch  Neck: No JVD or HJR. JVP = 4-5cm H2O. Good carotid upstroke with no bruit. No lymphadenopathy, No thyroid enlargement.  Cardiovascular: Regular rhythm, no ectopics.  Normal S1 compared to S2 " at the apex no murmur, gallop, rub or click. No lift, heave,  thrill, or cardiomegaly  Lungs: Normal effort, Clear to auscultation and percussion. No rales, rhonchi, wheezing   Abdomen: Soft, non tender. No liver, kidney, spleen or mass palpable. The abdominal aorta is not enlarged. Active bowel sounds are noted and there is no bruit  Extremities:  No cyanosis, edema, clubbing. Palpable posterior tibial and dorsal pedal pulses bilaterally.   Skin:   Warm and dry, no active lesions  Neurologic: Alert & oriented. Strength and sensation grossly intact. No lateralizing signs  Psychiatric:  Affect, mood, and judgement are appropriate    Assessment:     1. Essential hypertension  [facility] Treadmill Stress   2. Lightheadedness  [facility] Treadmill Stress       Medical Decision Making:  Today's Assessment / Status / Plan:   Patient's blood pressure is probably not perfectly controlled but I chose not to change any medications right now.  She has taken ACE inhibitors in the which provoked a dry cough.  She is taking a very good dose of propranolol which is likely the cause for her excessive bradycardia last week.  I suggested that she check her blood pressure first thing in the morning and at any time when she feels poorly.  I think an exercise stress test to evaluate blood pressure and pulse rate response to exercise would be reasonable and may provide information that would allow an appropriate change to her regimen.  This stress test will be scheduled and the patient will return in a couple of weeks to go over the results.  It would appear she is not particularly satisfied with my assessment of her problem but I hope she will follow-up with the treadmill and if necessary allow some modest change in her blood pressure regimen.  I explained to her that the goal of blood pressure should be 130/80 or below.  Her cardiac exam is normal.

## 2019-05-08 ENCOUNTER — NON-PROVIDER VISIT (OUTPATIENT)
Dept: MEDICAL GROUP | Facility: PHYSICIAN GROUP | Age: 49
End: 2019-05-08
Payer: COMMERCIAL

## 2019-05-08 DIAGNOSIS — Z30.9 ENCOUNTER FOR CONTRACEPTIVE MANAGEMENT, UNSPECIFIED TYPE: ICD-10-CM

## 2019-05-08 PROCEDURE — 96372 THER/PROPH/DIAG INJ SC/IM: CPT | Performed by: FAMILY MEDICINE

## 2019-05-08 NOTE — PROGRESS NOTES
Vanessa Mcdaniel is a 48 y.o. female here for a non-provider visit for Depo injection.    Reason for injection: Birth control  Order in MAR?: No  Patient supplied?:No  Minimum interval has been met for this injection (per MAR order): Yes    Order and dose verified by: Nahomi Bentley  Patient tolerated injection and no adverse effects were observed or reported: Yes    # of Administrations remaining in MAR: 0    Patient verified dosage with me due to an issue after the last dose. She stated that her blood pressure was elevated and she didn't feel well. She stated she went to the ER. The patient was not sure if the dosage was too high. I told her since we have been giving this, we have always given the patient 1mL. I advised the patient to let us know ASAP if she has the same reaction. She is wanting to know if there is something we can look at to see if the dosage is too much if she has another reaction.

## 2019-05-09 ENCOUNTER — TELEPHONE (OUTPATIENT)
Dept: MEDICAL GROUP | Facility: PHYSICIAN GROUP | Age: 49
End: 2019-05-09

## 2019-05-09 RX ORDER — MEDROXYPROGESTERONE ACETATE 150 MG/ML
150 INJECTION, SUSPENSION INTRAMUSCULAR ONCE
Status: COMPLETED | OUTPATIENT
Start: 2019-05-09 | End: 2019-05-09

## 2019-05-09 RX ADMIN — MEDROXYPROGESTERONE ACETATE 150 MG: 150 INJECTION, SUSPENSION INTRAMUSCULAR at 14:54

## 2019-05-09 NOTE — TELEPHONE ENCOUNTER
Patient verified dosage with me due to an issue after the last dose. She stated that her blood pressure was elevated and she didn't feel well. She stated she went to the ER. The patient was not sure if the dosage was too high. I told her since we have been giving this, we have always given the patient 1mL. I advised the patient to let us know ASAP if she has the same reaction. She is wanting to know if there is something we can look at to see if the dosage is too much if she has another reaction.

## 2019-05-09 NOTE — TELEPHONE ENCOUNTER
Records reviewed.  Depo provera is always given as 150 mg (1 ml) for contraceptive purposes).  It is rarely used at lower doses and is not routinely used in perimenopausal women.  It should not be an allergic reaction as she has been on it for some time.  If she is starting to have reactions to it we may need to stop it.

## 2019-09-17 ENCOUNTER — OFFICE VISIT (OUTPATIENT)
Dept: MEDICAL GROUP | Facility: PHYSICIAN GROUP | Age: 49
End: 2019-09-17
Payer: COMMERCIAL

## 2019-09-17 VITALS
BODY MASS INDEX: 19.78 KG/M2 | HEART RATE: 60 BPM | HEIGHT: 67 IN | OXYGEN SATURATION: 97 % | WEIGHT: 126 LBS | SYSTOLIC BLOOD PRESSURE: 114 MMHG | TEMPERATURE: 98 F | DIASTOLIC BLOOD PRESSURE: 70 MMHG

## 2019-09-17 DIAGNOSIS — I10 ESSENTIAL HYPERTENSION: ICD-10-CM

## 2019-09-17 DIAGNOSIS — G43.909 MIGRAINE WITHOUT STATUS MIGRAINOSUS, NOT INTRACTABLE, UNSPECIFIED MIGRAINE TYPE: ICD-10-CM

## 2019-09-17 PROCEDURE — 99214 OFFICE O/P EST MOD 30 MIN: CPT | Performed by: FAMILY MEDICINE

## 2019-09-17 RX ORDER — TRETINOIN 0.5 MG/G
CREAM TOPICAL
Refills: 3 | COMMUNITY
Start: 2019-07-30

## 2019-09-17 RX ORDER — ELETRIPTAN HYDROBROMIDE 40 MG/1
40 TABLET, FILM COATED ORAL
Qty: 9 TAB | Refills: 2 | Status: SHIPPED | OUTPATIENT
Start: 2019-09-17 | End: 2019-12-30 | Stop reason: SDUPTHER

## 2019-09-17 RX ORDER — PROPRANOLOL HYDROCHLORIDE 80 MG/1
80 TABLET ORAL 2 TIMES DAILY
Qty: 180 TAB | Refills: 1 | Status: SHIPPED | OUTPATIENT
Start: 2019-09-17 | End: 2020-03-05

## 2019-09-17 RX ORDER — LAMOTRIGINE 150 MG/1
150 TABLET ORAL
Refills: 0 | COMMUNITY
Start: 2019-07-08 | End: 2024-02-15

## 2019-09-17 ASSESSMENT — PATIENT HEALTH QUESTIONNAIRE - PHQ9: CLINICAL INTERPRETATION OF PHQ2 SCORE: 0

## 2019-09-17 NOTE — ASSESSMENT & PLAN NOTE
She has started to have migraines since stopping depo provera 5/8/2019.  She was previously on botox for migraines 2 years ago and this worked well but she stopped when the doctor closed.  She is getting migraines every few days.  She takes Relpax which helps.

## 2019-09-18 NOTE — ASSESSMENT & PLAN NOTE
She is on propranolol for hypertension.  She is tolerating it well.  She denies concerns with the medication.  Her BP is normal today.

## 2019-09-18 NOTE — PROGRESS NOTES
CC:migraines    HISTORY OF PRESENT ILLNESS: Patient is a 49 y.o. female established patient who presents today to discuss the following chronic unstable and chronic stable conditions    Health Maintenance: Completed    Migraines  She has started to have migraines since stopping depo provera 5/8/2019.  She was previously on botox for migraines 2 years ago and this worked well but she stopped when the doctor closed.  She is getting migraines every few days.  She takes Relpax which helps.      Essential hypertension  She is on propranolol for hypertension.  She is tolerating it well.  She denies concerns with the medication.  Her BP is normal today.       Patient Active Problem List    Diagnosis Date Noted   • Encounter for surveillance of contraceptives 10/08/2018   • Migraines 07/13/2018   • Primary insomnia 07/13/2018   • Essential hypertension 07/13/2018      Allergies:Ceclor [cefaclor] and Doxycycline    Current Outpatient Medications   Medication Sig Dispense Refill   • Tretinoin, Facial Wrinkles, (TRETINOIN, EMOLLIENT,) 0.05 % Cream APPLY TO THIN LAYER TO FACE EVERY 2-3 DAYS  3   • eletriptan (RELPAX) 40 MG tablet Take 1 Tab by mouth Once PRN for up to 1 dose. 9 Tab 2   • propranolol (INDERAL) 80 MG Tab Take 1 Tab by mouth 2 Times a Day for 90 days. 180 Tab 1   • ALPRAZolam (XANAX) 1 MG Tab TAKE ONE AND A HALF TO TWO TABS BY MOUTH AT BEDTIME AS NEEDED FOR INSOMNIA (F51.01)  0   • minocycline (MINOCIN) 100 MG Cap Take 100 mg by mouth every day.  1   • lamotrigine (LAMICTAL) 150 MG tablet Take 150 mg by mouth every day.  0     No current facility-administered medications for this visit.        Social History     Tobacco Use   • Smoking status: Never Smoker   • Smokeless tobacco: Never Used   Substance Use Topics   • Alcohol use: Yes     Alcohol/week: 1.2 oz     Types: 2 Glasses of wine per week   • Drug use: No     Social History     Social History Narrative   • Not on file       Family History   Problem  "Relation Age of Onset   • Depression Mother        Review of Systems:      - Constitutional: Negative for fever, chills, unexpected weight change, and fatigue/generalized weakness.     - HEENT: Negative for  vision changes, hearing changes, ear pain, ear discharge, rhinorrhea, sinus congestion, sore throat, and neck pain.      - Respiratory: Negative for cough, sputum production, chest congestion, dyspnea, wheezing, and crackles.      - Cardiovascular: Negative for chest pain, palpitations, orthopnea, and bilateral lower extremity edema.     - - Skin: Negative for rash, itching, cyanotic skin color change.     - Neurological: Negative for dizziness, tingling, tremors, focal sensory deficit, focal weakness and     Exam:    /70 (BP Location: Right arm, Patient Position: Sitting, BP Cuff Size: Adult)   Pulse 60   Temp 36.7 °C (98 °F)   Ht 1.702 m (5' 7\")   Wt 57.2 kg (126 lb)   SpO2 97%  Body mass index is 19.73 kg/m².    General:  Well nourished, well developed female in NAD  Head is grossly normal.  Neck: Supple without JVD or bruit. Thyroid is not enlarged.  Pulmonary: Clear to ausculation and percussion.  Normal effort. No rales, ronchi, or wheezing.  Cardiovascular: Regular rate and rhythm without murmur. Carotid and radial pulses are intact and equal bilaterally.  Extremities: no clubbing, cyanosis, or edema.  Neuro: CNII-XII intact, no focal motor or sensory deficits    Assessment/Plan:  1. Migraine without status migrainosus, not intractable, unspecified migraine type  Symptoms have worsened after stopping depo provera.  At this point she no longer requires depo for birth control and there is no reason to continue it, particularly with the concern for osteoporosis.  Ideally this will improve as hormones regulate as she is no longer menstruating but at this time migraines are frequent and sometimes unmanageable.  She has responded to botox in the past and will be referred to consider this again.  She " is on propranolol but does not believe it helps.  Other preventive medications may interact with her current psychiatric medications so we will not change them at this time.  She can continue the triptan for now.  - REFERRAL TO NEUROLOGY  - eletriptan (RELPAX) 40 MG tablet; Take 1 Tab by mouth Once PRN for up to 1 dose.  Dispense: 9 Tab; Refill: 2  - propranolol (INDERAL) 80 MG Tab; Take 1 Tab by mouth 2 Times a Day for 90 days.  Dispense: 180 Tab; Refill: 1    2. Essential hypertension  This is at goal, continue propranolol.

## 2019-09-27 ENCOUNTER — OFFICE VISIT (OUTPATIENT)
Dept: MEDICAL GROUP | Facility: LAB | Age: 49
End: 2019-09-27
Payer: COMMERCIAL

## 2019-09-27 VITALS
HEART RATE: 67 BPM | DIASTOLIC BLOOD PRESSURE: 78 MMHG | WEIGHT: 126 LBS | BODY MASS INDEX: 19.78 KG/M2 | TEMPERATURE: 98.1 F | OXYGEN SATURATION: 97 % | SYSTOLIC BLOOD PRESSURE: 118 MMHG | RESPIRATION RATE: 12 BRPM | HEIGHT: 67 IN

## 2019-09-27 DIAGNOSIS — I10 ESSENTIAL HYPERTENSION: ICD-10-CM

## 2019-09-27 DIAGNOSIS — F51.01 PRIMARY INSOMNIA: ICD-10-CM

## 2019-09-27 DIAGNOSIS — G43.709 CHRONIC MIGRAINE WITHOUT AURA WITHOUT STATUS MIGRAINOSUS, NOT INTRACTABLE: ICD-10-CM

## 2019-09-27 DIAGNOSIS — Z78.0 MENOPAUSE: ICD-10-CM

## 2019-09-27 PROCEDURE — 99214 OFFICE O/P EST MOD 30 MIN: CPT | Mod: 25 | Performed by: FAMILY MEDICINE

## 2019-09-27 PROCEDURE — 96372 THER/PROPH/DIAG INJ SC/IM: CPT | Performed by: FAMILY MEDICINE

## 2019-09-27 RX ORDER — MEDROXYPROGESTERONE ACETATE 150 MG/ML
150 INJECTION, SUSPENSION INTRAMUSCULAR ONCE
Status: COMPLETED | OUTPATIENT
Start: 2019-09-27 | End: 2019-09-27

## 2019-09-27 RX ADMIN — MEDROXYPROGESTERONE ACETATE 150 MG: 150 INJECTION, SUSPENSION INTRAMUSCULAR at 07:39

## 2019-09-27 ASSESSMENT — ENCOUNTER SYMPTOMS
SENSORY CHANGE: 0
FEVER: 0
TREMORS: 0
TINGLING: 0
VOMITING: 0
BLURRED VISION: 0
ABDOMINAL PAIN: 0
CHILLS: 0
HEADACHES: 1
DIZZINESS: 0
SHORTNESS OF BREATH: 0
DIARRHEA: 0

## 2019-09-27 NOTE — PROGRESS NOTES
"Vanessa Mcdaniel is a 49 y.o. female here for   Chief Complaint   Patient presents with   • Establish Care       HPI:  Vanessa is a very pleasant 49 y.o. female.  With history of essential hypertension, migraines, insomnia here to establish care as well as discuss migraines.    #Migraines: Patient says she has had a history of migraines since she was a teenager.  Headaches described as being as a dull aching sensation in the back of her head, rating up to the front to a throbbing headache felt on both sides.  Denies any aura.  At times the headaches will be what she describes as an \"3-day headache\" with associated symptoms of nausea, vomiting, photophobia.  She has been seen by previous physicians and placed on several medications for both prophylaxis and abortive therapy.  -Current abortive treatment with Relpax (has tried other triptan's previously with no relief).  -Previous preventative therapies attempted include amitriptyline, other antidepressants, other anticonvulsants.  All of which were either not effective or letter to a lot of side effects.  -Patient was on Depo-Provera for prevention she states this helped keep migraines to approximately once a month.  She recently stopped back in [place].  -Patient does state that she has tried Botox approximately 2 years ago which she states helped the best as it prevented any migraines for over 6 months.  She is currently waiting to get into neurology at this time smoker however, due to large weight loss she is unable to get in for over 6 months.    Since being off her Depo-Provera the migraines have worsened significantly.  She states that she is having a migraine approximately 3 times a week.  Is beginning to severely affect her work as well as her social life.  She is here to discuss further treatment at this time.    #Insomnia: Patient does have an extensive history of insomnia.  She used to be on Xanax; however, is working with her psychiatrist to wean her off the " Xanax.  She is currently working on weaning with Lamictal.    #Hypertension: Currently on propranolol 80 mg twice daily.  No side effects to medications including lightheadedness, dizziness, shortness of breath, syncope.  Denies any chest pain, abdominal pain.  She states her blood pressures been relatively under control with no concern at this time.    Current medicines (including changes today)  Current Outpatient Medications   Medication Sig Dispense Refill   • lamotrigine (LAMICTAL) 150 MG tablet Take 150 mg by mouth every day.  0   • Tretinoin, Facial Wrinkles, (TRETINOIN, EMOLLIENT,) 0.05 % Cream APPLY TO THIN LAYER TO FACE EVERY 2-3 DAYS  3   • eletriptan (RELPAX) 40 MG tablet Take 1 Tab by mouth Once PRN for up to 1 dose. 9 Tab 2   • propranolol (INDERAL) 80 MG Tab Take 1 Tab by mouth 2 Times a Day for 90 days. 180 Tab 1   • ALPRAZolam (XANAX) 1 MG Tab TAKE ONE AND A HALF TO TWO TABS BY MOUTH AT BEDTIME AS NEEDED FOR INSOMNIA (F51.01)  0   • minocycline (MINOCIN) 100 MG Cap Take 100 mg by mouth every day.  1     No current facility-administered medications for this visit.      She  has a past medical history of Hypertension, Insomnia, and Migraine.  She  has a past surgical history that includes acl repair (Right) and mammoplasty augmentation (Bilateral).  Social History     Tobacco Use   • Smoking status: Never Smoker   • Smokeless tobacco: Never Used   Substance Use Topics   • Alcohol use: Yes     Alcohol/week: 1.2 oz     Types: 2 Glasses of wine per week   • Drug use: No     Social History     Social History Narrative   • Not on file     Family History   Problem Relation Age of Onset   • Depression Mother      Family Status   Relation Name Status   • Mo  Alive   • Fa  Alive   • Sis  Alive   • Bro  Alive         ROS  Review of Systems   Constitutional: Negative for chills and fever.   HENT: Negative for hearing loss.    Eyes: Negative for blurred vision.   Respiratory: Negative for shortness of breath.   "  Cardiovascular: Negative for chest pain.   Gastrointestinal: Negative for abdominal pain, diarrhea and vomiting.   Neurological: Positive for headaches. Negative for dizziness, tingling, tremors and sensory change.        Objective:     /78   Pulse 67   Temp 36.7 °C (98.1 °F)   Resp 12   Ht 1.702 m (5' 7.01\")   Wt 57.2 kg (126 lb)   SpO2 97%  Body mass index is 19.73 kg/m².  Physical Exam:    Constitutional: Alert, no distress.  Skin: Warm, dry, good turgor, no rashes in visible areas.  Eye: Equal, round and reactive, conjunctiva clear, lids normal.  ENMT: Lips without lesions, good dentition, oropharynx clear. TM's pearly gray with normal light reflexes bilaterally  Neck: Trachea midline, no masses, no thyromegaly. No cervical or supraclavicular lymphadenopathy.  Respiratory: Unlabored respiratory effort, lungs clear to auscultation bilaterally, no wheezes, rales, or ronchi.  Cardiovascular: Normal S1, S2, RRR, no murmur, no edema.  Abdomen: Soft, non-tender, no masses, no hepatosplenomegaly.  Psych: Alert and oriented x3, normal affect and mood.  Neuro: No focal/motor deficits.  Cranial nerves I to XII intact.      Assessment and Plan:   The following treatment plan was discussed    1. Chronic migraine without aura without status migrainosus, not intractable  -At this time given her limitations as far as treatment due to previous attempts with medications and concern for concomitant use of beta-blockers and/or anticonvulsants at this time I do believe the best course of action would be to restart the Depo-Provera at this time while waiting to get in with neurology for Botox injections.  -Discussed in detail with patient the risks of long-term use of Depo-Provera, namely decreased bone density.  At this time we will also order DEXA scan to check bone density.  Counseled patient to begin calcium, vitamin D supplementation as well as the importance of loadbearing exercises.  -Once patient is able to get " into neurology for Botox injections we will stop Depo-Provera at that time.  -Continue with Relpax as needed.  - medroxyPROGESTERone (DEPO-PROVERA) injection 150 mg    2. Primary insomnia  -Status: Stable.  Continue to work with psychiatrist on weaning off Xanax.  Continue with lamotrigine    3. Essential hypertension  -Status: Stable.  Continue with propranolol 80 mg twice daily.    4. Menopause  -Patient is status post menopause at this point, we will check DEXA at this time.  Patient at high risk also due to chronic Depo-Provera usage.  - DS-BONE DENSITY STUDY (DEXA); Future        Records requested.  Followup: Return in about 3 months (around 12/27/2019).         This note was created using voice recognition software. I have made every reasonable attempt to correct errors, however, I do anticipate some grammatical errors.

## 2019-12-30 ENCOUNTER — OFFICE VISIT (OUTPATIENT)
Dept: MEDICAL GROUP | Facility: LAB | Age: 49
End: 2019-12-30
Payer: COMMERCIAL

## 2019-12-30 VITALS
RESPIRATION RATE: 19 BRPM | SYSTOLIC BLOOD PRESSURE: 100 MMHG | BODY MASS INDEX: 19.93 KG/M2 | HEART RATE: 59 BPM | DIASTOLIC BLOOD PRESSURE: 62 MMHG | OXYGEN SATURATION: 99 % | WEIGHT: 127 LBS | TEMPERATURE: 97.9 F | HEIGHT: 67 IN

## 2019-12-30 DIAGNOSIS — Z78.0 MENOPAUSE: ICD-10-CM

## 2019-12-30 DIAGNOSIS — G43.909 MIGRAINE WITHOUT STATUS MIGRAINOSUS, NOT INTRACTABLE, UNSPECIFIED MIGRAINE TYPE: ICD-10-CM

## 2019-12-30 PROCEDURE — 99214 OFFICE O/P EST MOD 30 MIN: CPT | Performed by: FAMILY MEDICINE

## 2019-12-30 RX ORDER — QUETIAPINE FUMARATE 25 MG/1
TABLET, FILM COATED ORAL
COMMUNITY
Start: 2019-11-22 | End: 2024-02-15

## 2019-12-30 RX ORDER — ALPRAZOLAM 0.5 MG/1
TABLET ORAL
COMMUNITY
Start: 2019-12-17 | End: 2024-02-15

## 2019-12-30 RX ORDER — ELETRIPTAN HYDROBROMIDE 40 MG/1
40 TABLET, FILM COATED ORAL
Qty: 9 TAB | Refills: 2 | Status: SHIPPED | OUTPATIENT
Start: 2019-12-30 | End: 2020-03-18

## 2019-12-30 RX ORDER — MEDROXYPROGESTERONE ACETATE 150 MG/ML
150 INJECTION, SUSPENSION INTRAMUSCULAR ONCE
Status: COMPLETED | OUTPATIENT
Start: 2019-12-30 | End: 2019-12-30

## 2019-12-30 RX ADMIN — MEDROXYPROGESTERONE ACETATE 150 MG: 150 INJECTION, SUSPENSION INTRAMUSCULAR at 09:18

## 2019-12-30 ASSESSMENT — ENCOUNTER SYMPTOMS
SHORTNESS OF BREATH: 0
VOMITING: 0
DOUBLE VISION: 0
CONSTIPATION: 0
PALPITATIONS: 0
FEVER: 0
NAUSEA: 0
CHILLS: 0
WEIGHT LOSS: 0
ABDOMINAL PAIN: 0
BLURRED VISION: 0
DIAPHORESIS: 0
DIARRHEA: 0

## 2019-12-30 NOTE — PROGRESS NOTES
"Subjective:   Vanessa Mcdaniel is a 49 y.o. female here today for   Chief Complaint   Patient presents with   • Follow-Up   • Injections     Patient request depo shot    • Medication Refill     eletriptan (RELPAX) 40 MG tablet Quanity 9 tablets      #Migraine  -At previous appointment patient restarted Depo.  She states that since having a Depakote her migraines have improved dramatically.  She states that she has not had one migraine until about 2 weeks ago when she felt like the double was \"wearing off\".  At that time she is taking abortive therapy Relpax.  She states that she is been taking about once or twice a week for successful  of migraine headaches.  -Also currently on Lamictal 50 mg daily.  Denies any side effects of medication.  Compliant with medication.  -Continues to be interested in further treatment including Botox.  Requesting referral to neurology at this time.  -Denies any associated symptoms of facial drooping, facial numbness, difficulty speaking, difficulty swallowing, numbness/tingling/weakness in upper or lower extremities with migraines.    #Menopause:  -Patient states that while she understands that she has been taking the Depakote, she is not had a.  In several years.  She states that she has not had a normal period since her \"90s\".  She denies any other menopausal symptoms including hot flashes, vaginal dryness, fatigue, emotional lability.    Allergies   Allergen Reactions   • Ceclor [Cefaclor] Swelling   • Doxycycline      Feels \"hungover\"         Current medicines (including changes today)  Current Outpatient Medications   Medication Sig Dispense Refill   • ALPRAZolam (XANAX) 0.5 MG Tab      • QUEtiapine (SEROQUEL) 25 MG Tab      • lamotrigine (LAMICTAL) 150 MG tablet Take 150 mg by mouth every day.  0   • Tretinoin, Facial Wrinkles, (TRETINOIN, EMOLLIENT,) 0.05 % Cream APPLY TO THIN LAYER TO FACE EVERY 2-3 DAYS  3   • eletriptan (RELPAX) 40 MG tablet Take 1 Tab by mouth Once PRN " "for up to 1 dose. 9 Tab 2   • minocycline (MINOCIN) 100 MG Cap Take 100 mg by mouth every day.  1   • ALPRAZolam (XANAX) 1 MG Tab TAKE ONE AND A HALF TO TWO TABS BY MOUTH AT BEDTIME AS NEEDED FOR INSOMNIA (F51.01)  0     No current facility-administered medications for this visit.      She  has a past medical history of Hypertension, Insomnia, and Migraine.    ROS   Review of Systems   Constitutional: Negative for chills, diaphoresis, fever, malaise/fatigue and weight loss.   HENT: Negative for hearing loss.    Eyes: Negative for blurred vision and double vision.   Respiratory: Negative for shortness of breath.    Cardiovascular: Negative for chest pain and palpitations.   Gastrointestinal: Negative for abdominal pain, constipation, diarrhea, nausea and vomiting.   Skin: Negative for rash.   All other systems reviewed and are negative.         Objective:     Physical Exam:  /62 (BP Location: Right arm, Patient Position: Sitting, BP Cuff Size: Adult)   Pulse (!) 59   Temp 36.6 °C (97.9 °F) (Temporal)   Resp 19   Ht 1.702 m (5' 7\")   Wt 57.6 kg (127 lb)   SpO2 99%  Body mass index is 19.89 kg/m².   Constitutional: Alert, no distress.  Skin: Warm, dry, good turgor, no rashes in visible areas.  Eye: Equal, round and reactive, conjunctiva clear, lids normal.  ENMT: TM's clear bilaterally, lips without lesions, good dentition, oropharynx clear.  Neck: Trachea midline, no masses, no thyromegaly. No cervical or supraclavicular lymphadenopathy.  Respiratory: Unlabored respiratory effort, lungs clear to auscultation, no wheezes, no rhonchi.  Cardiovascular: Normal S1, S2, no murmur, no edema.  Abdomen: Soft, non-tender, no masses, no hepatosplenomegaly.  Psych: Alert and oriented x3, normal affect and mood.    Assessment and Plan:     1. Migraine without status migrainosus, not intractable, unspecified migraine type  -Status: Stable.  -We will continue treatment of migraines with prophylaxis of Depo-Provera " injection.  Again discussed with patient the risk of continued long-term use of Depo including bone thinning and osteoporosis.  Patient understands these risks would like to continue with treatment at this time.  -We will check DEXA scan to assure no signs of bone thinning, or osteoporosis.  -Continue taking Relpax as needed.  -We will also put a referral in for neurology for further treatment including Botox.  -Follow-up in 3 months.  - REFERRAL TO NEUROLOGY  - eletriptan (RELPAX) 40 MG tablet; Take 1 Tab by mouth Once PRN for up to 1 dose.  Dispense: 9 Tab; Refill: 2  - medroxyPROGESTERone (DEPO-PROVERA) injection 150 mg  - POCT PREGNANCY    2. Menopause  -At this time is unsure if patient undergone menopause or not given the paucity and symptoms; however, given her history of irregular periods/no periods for several years as well as history of long-term use of Depo we will secure DEXA at this time.  - DS-BONE DENSITY STUDY (DEXA); Future      Followup: Return in about 3 months (around 3/30/2020).         PLEASE NOTE: This dictation was created using voice recognition software. I have made every reasonable attempt to correct obvious errors, but I expect that there are errors of grammar and possibly content that I did not discover before finalizing the note.

## 2020-03-05 DIAGNOSIS — G43.909 MIGRAINE WITHOUT STATUS MIGRAINOSUS, NOT INTRACTABLE, UNSPECIFIED MIGRAINE TYPE: ICD-10-CM

## 2020-03-05 RX ORDER — PROPRANOLOL HYDROCHLORIDE 80 MG/1
80 TABLET ORAL 2 TIMES DAILY
Qty: 180 TAB | Refills: 1 | Status: SHIPPED | OUTPATIENT
Start: 2020-03-05 | End: 2020-08-12 | Stop reason: SDUPTHER

## 2020-03-18 DIAGNOSIS — G43.909 MIGRAINE WITHOUT STATUS MIGRAINOSUS, NOT INTRACTABLE, UNSPECIFIED MIGRAINE TYPE: ICD-10-CM

## 2020-03-18 RX ORDER — ELETRIPTAN HYDROBROMIDE 40 MG/1
TABLET, FILM COATED ORAL
Qty: 9 TAB | Refills: 2 | Status: SHIPPED | OUTPATIENT
Start: 2020-03-18 | End: 2024-02-15

## 2020-03-19 ENCOUNTER — OFFICE VISIT (OUTPATIENT)
Dept: MEDICAL GROUP | Facility: LAB | Age: 50
End: 2020-03-19
Payer: COMMERCIAL

## 2020-03-19 VITALS
DIASTOLIC BLOOD PRESSURE: 78 MMHG | TEMPERATURE: 97.8 F | WEIGHT: 130 LBS | RESPIRATION RATE: 13 BRPM | BODY MASS INDEX: 20.4 KG/M2 | HEIGHT: 67 IN | OXYGEN SATURATION: 98 % | HEART RATE: 69 BPM | SYSTOLIC BLOOD PRESSURE: 144 MMHG

## 2020-03-19 DIAGNOSIS — Z91.09 ENVIRONMENTAL ALLERGIES: ICD-10-CM

## 2020-03-19 DIAGNOSIS — M76.32 ILIOTIBIAL BAND SYNDROME OF LEFT SIDE: ICD-10-CM

## 2020-03-19 DIAGNOSIS — G43.709 CHRONIC MIGRAINE WITHOUT AURA WITHOUT STATUS MIGRAINOSUS, NOT INTRACTABLE: ICD-10-CM

## 2020-03-19 PROCEDURE — 99214 OFFICE O/P EST MOD 30 MIN: CPT | Performed by: FAMILY MEDICINE

## 2020-03-19 RX ORDER — FLUTICASONE PROPIONATE 50 MCG
2 SPRAY, SUSPENSION (ML) NASAL DAILY
Qty: 16 G | Refills: 6 | Status: SHIPPED | OUTPATIENT
Start: 2020-03-19

## 2020-03-19 RX ORDER — MEDROXYPROGESTERONE ACETATE 150 MG/ML
150 INJECTION, SUSPENSION INTRAMUSCULAR ONCE
Status: COMPLETED | OUTPATIENT
Start: 2020-03-19 | End: 2020-03-19

## 2020-03-19 RX ADMIN — MEDROXYPROGESTERONE ACETATE 150 MG: 150 INJECTION, SUSPENSION INTRAMUSCULAR at 09:04

## 2020-03-19 ASSESSMENT — ENCOUNTER SYMPTOMS
CHILLS: 0
CONSTIPATION: 0
WHEEZING: 0
VOMITING: 0
NAUSEA: 0
BLURRED VISION: 0
FEVER: 0
DIARRHEA: 0
ABDOMINAL PAIN: 0
SHORTNESS OF BREATH: 0

## 2020-03-19 ASSESSMENT — PATIENT HEALTH QUESTIONNAIRE - PHQ9: CLINICAL INTERPRETATION OF PHQ2 SCORE: 0

## 2020-03-19 NOTE — PROGRESS NOTES
"Subjective:   Vanessa Mcdaniel is a 49 y.o. female here today for   Chief Complaint   Patient presents with   • Follow-Up     Depo shot.    • Recurrent Otitis     dizzyiness in afternoon past few weeks.    • Lump     lump onteight by glute, cause radiating leg pain, couple month ago, past week or to more painful.      #Left leg pain:  -Approximately 2 months ago she started experiencing left leg pain.  She describes the pain as a dull ache, located in her left hip, gluteal region.  States the pain will radiate down into the lateral, superior aspect of her knee.  It is worse when walking and extended times with her dog, especially going uphill.  Relieved with rest.  She denies any other palliative or provocative factors.  No previous injuries, no acute trauma.    #Migraine:  -Patient states that she is relatively asymptomatic.  Has not had to use well course since last visit 3 months ago.  She is extremely happy using Depo as prophylaxis like to continue at this time.  -Patient has not yet completed DEXA scan.    #Recurrent otitis:  -Patient states that she has \"recurrent otitis\".  She states that she will get right ear pain with occasional dizziness.  She also states that she will have a runny nose, get slightly congested, occasional dry cough.  She states that she has not had these symptoms since being home and not working due to the coronavirus outbreak.  Symptoms she is having at this time is some slight ear pain.  -Denies any fevers, chills, chest pain, shortness of breath      Allergies   Allergen Reactions   • Ceclor [Cefaclor] Swelling   • Doxycycline      Feels \"hungover\"         Current medicines (including changes today)  Current Outpatient Medications   Medication Sig Dispense Refill   • fluticasone (FLONASE) 50 MCG/ACT nasal spray Spray 2 Sprays in nose every day. 16 g 6   • eletriptan (RELPAX) 40 MG tablet TAKE 1 TAB BY MOUTH ONCE AS NEEDED FOR UP TO 1 DOSE. 9 Tab 2   • propranolol (INDERAL) 80 MG Tab " "TAKE 1 TAB BY MOUTH 2 TIMES A DAY FOR 90 DAYS. 180 Tab 1   • ALPRAZolam (XANAX) 0.5 MG Tab      • QUEtiapine (SEROQUEL) 25 MG Tab      • lamotrigine (LAMICTAL) 150 MG tablet Take 150 mg by mouth every day.  0   • Tretinoin, Facial Wrinkles, (TRETINOIN, EMOLLIENT,) 0.05 % Cream APPLY TO THIN LAYER TO FACE EVERY 2-3 DAYS  3   • minocycline (MINOCIN) 100 MG Cap Take 100 mg by mouth every day.  1     No current facility-administered medications for this visit.      She  has a past medical history of Hypertension, Insomnia, and Migraine.    ROS   Review of Systems   Constitutional: Negative for chills and fever.   HENT: Negative for hearing loss.    Eyes: Negative for blurred vision.   Respiratory: Negative for shortness of breath and wheezing.    Cardiovascular: Negative for chest pain.   Gastrointestinal: Negative for abdominal pain, constipation, diarrhea, nausea and vomiting.   Skin: Negative for rash.   All other systems reviewed and are negative.     Objective:     Physical Exam:  /78 (BP Location: Right arm, Patient Position: Sitting, BP Cuff Size: Adult)   Pulse 69   Temp 36.6 °C (97.8 °F) (Temporal)   Resp 13   Ht 1.702 m (5' 7\")   Wt 59 kg (130 lb)   SpO2 98%  Body mass index is 20.36 kg/m².    Const: Vitals above. Well-appearing.  CV: Inspected for lower extremity edema. Abdomen inspected for abnormal pulsation. Femoral pulse palpated, noting below if less than 2+.  GI: abdomen evaluated, noting below for tenderness to palpation, masses, or hernia.  HEENT: TMs were appreciated bilaterally.  Slight air-fluid interface noted on right TM.  Not erythematous, TMs were pearly with normal light reflex.  Skin: Inspected for rash or lesions in area of concern.  Neuro/psych: Reflexes at bilateral patella (L4), Achilles (S1) evaluated. Sensation to light touch evaluated at medial thigh (L3), medial leg/foot (L4), lateral leg/dorsal foot (L5), lateral foot (S1). Straight leg raise done in sitting/supine/prone " position. Observed for normal mood/affect/insight.  MSK: Gait and posture evaluated. Examination of pelvis:  - Inspected/palpated for leg length discrepancy, and tenderness at the pubic symphysis, pubic rami, iliac crests, ASIS, SI joints, PSIS, ischial tuberosity, and sciatic notch. Evaluated SI compression and AYO for tenderness. Apophyses palpated for tenderness (if age appropriate): N/A.  - Assessed stability with evaluation for abnormal SI motion. Examination of bilateral hips:  - Inspected/palpated for tenderness at the IT band, greater trochanter, hamstrings, and adductor insertions.  - Assessed passive and active range of motion with hip flexion, extension, abduction, adduction, external rotation, and internal rotation, noting below for any pain or crepitation. Log roll, FADIR, Shanita test performed.  - Assessed muscle strength in hip flexion, extension, abduction, adduction, external/internal rotation, noting below if less than 5/5 or pain. Observed for muscle atrophy.  - Assessed hip stability with evaluation for laxity or pain with FADIR, AYO, and range of motion testing above.     All of the above were found to be normal, except:  Slight pain with AYO.  Slight tenderness palpation over the greater greater trochanter.  Tenderness to palpation at the superior lateral border of the knee      Assessment and Plan:     1. Chronic migraine without aura without status migrainosus, not intractable  -Status: Stable.  Will continue with current medication of Depo-Provera for prophylaxis.  I will also continue with Relpax as needed for abortive therapy.  -Follow-up in 3 months for Depo-Provera injection.  -Encourage patient to complete DEXA scan to assure no signs of osteoporosis given Depo-Provera use at her age.  - medroxyPROGESTERone (DEPO-PROVERA) injection 150 mg    2. Environmental allergies  -At this time begin treatment with Flonase, second-generation antihistamine such as Allegra or Zyrtec.  -Encourage  patient to look for any offending articulate or substances which cause allergic symptoms at work when returning.    3. Iliotibial band syndrome of left side  -Signs/symptoms consistent with pain due to IT band dysfunction.  -Discussed conservative measures including stretches, ice, anti-inflammatories.  -Home physical therapy activities were given.  Patient will follow-up if no improvement.    Followup: Return in about 3 months (around 6/19/2020), or if symptoms worsen or fail to improve.         PLEASE NOTE: This dictation was created using voice recognition software. I have made every reasonable attempt to correct obvious errors, but I expect that there are errors of grammar and possibly content that I did not discover before finalizing the note.

## 2020-04-16 ENCOUNTER — OFFICE VISIT (OUTPATIENT)
Dept: MEDICAL GROUP | Facility: LAB | Age: 50
End: 2020-04-16
Payer: COMMERCIAL

## 2020-04-16 VITALS
OXYGEN SATURATION: 97 % | TEMPERATURE: 98.1 F | SYSTOLIC BLOOD PRESSURE: 130 MMHG | HEART RATE: 64 BPM | DIASTOLIC BLOOD PRESSURE: 80 MMHG | RESPIRATION RATE: 16 BRPM | WEIGHT: 129 LBS | BODY MASS INDEX: 20.25 KG/M2 | HEIGHT: 67 IN

## 2020-04-16 DIAGNOSIS — L60.0 INGROWN TOENAIL: ICD-10-CM

## 2020-04-16 PROCEDURE — 99213 OFFICE O/P EST LOW 20 MIN: CPT | Performed by: FAMILY MEDICINE

## 2020-04-19 ASSESSMENT — ENCOUNTER SYMPTOMS
VOMITING: 0
NAUSEA: 0
ABDOMINAL PAIN: 0
FEVER: 0
DIARRHEA: 0
PALPITATIONS: 0
COUGH: 0
CHILLS: 0
SENSORY CHANGE: 0

## 2020-04-19 NOTE — PROGRESS NOTES
"Subjective:   Vanessa Mcdaniel is a 49 y.o. female here today for   Chief Complaint   Patient presents with   • Toe Pain     right toe       #Right toe pain  -For the last 1 month patient has had pain in right big toe. Pain located on medial aspect of toenail on great toe. Constant ache with an intermittent sharp pain.   -worse with prolonged standing/walking.   -Pain non radiating.   -Denies any swelling, redness, heat to touch.   -Endorses slight pain to touch.   -No previous injury     Allergies   Allergen Reactions   • Ceclor [Cefaclor] Swelling   • Doxycycline      Feels \"hungover\"         Current medicines (including changes today)  Current Outpatient Medications   Medication Sig Dispense Refill   • fluticasone (FLONASE) 50 MCG/ACT nasal spray Spray 2 Sprays in nose every day. 16 g 6   • triazolam (HALCION) 0.25 MG Tab Take 0.5 mg by mouth every day.     • eletriptan (RELPAX) 40 MG tablet TAKE 1 TAB BY MOUTH ONCE AS NEEDED FOR UP TO 1 DOSE. 9 Tab 2   • propranolol (INDERAL) 80 MG Tab TAKE 1 TAB BY MOUTH 2 TIMES A DAY FOR 90 DAYS. 180 Tab 1   • ALPRAZolam (XANAX) 0.5 MG Tab      • QUEtiapine (SEROQUEL) 25 MG Tab      • lamotrigine (LAMICTAL) 150 MG tablet Take 150 mg by mouth every day.  0   • Tretinoin, Facial Wrinkles, (TRETINOIN, EMOLLIENT,) 0.05 % Cream APPLY TO THIN LAYER TO FACE EVERY 2-3 DAYS  3   • minocycline (MINOCIN) 100 MG Cap Take 100 mg by mouth every day.  1     No current facility-administered medications for this visit.      She  has a past medical history of Hypertension, Insomnia, and Migraine.    ROS   Review of Systems   Constitutional: Negative for chills and fever.   HENT: Negative for hearing loss.    Respiratory: Negative for cough.    Cardiovascular: Negative for chest pain and palpitations.   Gastrointestinal: Negative for abdominal pain, diarrhea, nausea and vomiting.   Musculoskeletal: Negative for joint pain.   Skin: Negative for rash.   Neurological: Negative for sensory change. " "  All other systems reviewed and are negative.     Objective:     Physical Exam:  /80 (BP Location: Right arm, Patient Position: Sitting, BP Cuff Size: Adult)   Pulse 64   Temp 36.7 °C (98.1 °F) (Temporal)   Resp 16   Ht 1.702 m (5' 7\")   Wt 58.5 kg (129 lb)   SpO2 97%  Body mass index is 20.2 kg/m².   Constitutional: Alert, no distress.  Skin: Warm, dry, good turgor, no rashes in visible areas. Slight tenderness to palpation to the medial edge of the great toe on right side. The toenail appeared to be ingrown. No erythema, no heat.   Respiratory: Unlabored respiratory effort, lungs clear to auscultation, no wheezes, no rhonchi.  Cardiovascular: Normal S1, S2, no murmur, no edema.  Abdomen: Soft, non-tender, no masses, no hepatosplenomegaly.  Psych: Alert and oriented x3, normal affect and mood.    Assessment and Plan:     1. Ingrown toenail  -Conservative measures including warm epsom salt, using floss or dental floss to relieve ingrown toenail.   -No signs of infection, no antibiotics needed at this time.   -Follow up precautions given including swelling, increased pain, heat to touch, purulence.     Followup: Return if symptoms worsen or fail to improve.         PLEASE NOTE: This dictation was created using voice recognition software. I have made every reasonable attempt to correct obvious errors, but I expect that there are errors of grammar and possibly content that I did not discover before finalizing the note.  "

## 2020-06-05 ENCOUNTER — OFFICE VISIT (OUTPATIENT)
Dept: MEDICAL GROUP | Facility: LAB | Age: 50
End: 2020-06-05
Payer: COMMERCIAL

## 2020-06-05 VITALS
WEIGHT: 128 LBS | BODY MASS INDEX: 20.09 KG/M2 | TEMPERATURE: 98.7 F | HEART RATE: 60 BPM | SYSTOLIC BLOOD PRESSURE: 108 MMHG | DIASTOLIC BLOOD PRESSURE: 74 MMHG | OXYGEN SATURATION: 98 % | RESPIRATION RATE: 12 BRPM | HEIGHT: 67 IN

## 2020-06-05 DIAGNOSIS — G43.909 MIGRAINE WITHOUT STATUS MIGRAINOSUS, NOT INTRACTABLE, UNSPECIFIED MIGRAINE TYPE: ICD-10-CM

## 2020-06-05 DIAGNOSIS — F51.01 PRIMARY INSOMNIA: ICD-10-CM

## 2020-06-05 DIAGNOSIS — Z30.42 ENCOUNTER FOR SURVEILLANCE OF INJECTABLE CONTRACEPTIVE: ICD-10-CM

## 2020-06-05 PROCEDURE — 99214 OFFICE O/P EST MOD 30 MIN: CPT | Performed by: FAMILY MEDICINE

## 2020-06-05 RX ORDER — MEDROXYPROGESTERONE ACETATE 150 MG/ML
150 INJECTION, SUSPENSION INTRAMUSCULAR ONCE
Status: COMPLETED | OUTPATIENT
Start: 2020-06-05 | End: 2020-06-05

## 2020-06-05 RX ORDER — ERENUMAB-AOOE 70 MG/ML
70 INJECTION SUBCUTANEOUS ONCE
Qty: 1 ML | Refills: 0 | Status: SHIPPED | OUTPATIENT
Start: 2020-06-05 | End: 2020-08-10

## 2020-06-05 RX ADMIN — MEDROXYPROGESTERONE ACETATE 150 MG: 150 INJECTION, SUSPENSION INTRAMUSCULAR at 08:59

## 2020-06-05 ASSESSMENT — ENCOUNTER SYMPTOMS
CHILLS: 0
TREMORS: 0
ABDOMINAL PAIN: 0
FEVER: 0
TINGLING: 0
DIARRHEA: 0
SENSORY CHANGE: 0
PALPITATIONS: 0
SPEECH CHANGE: 0
NAUSEA: 0
SHORTNESS OF BREATH: 0
WHEEZING: 0
VOMITING: 0
BLURRED VISION: 0

## 2020-06-05 NOTE — PROGRESS NOTES
"Subjective:   Vanessa Mcdaniel is a 50 y.o. female here today for   Chief Complaint   Patient presents with   • Follow-Up   • Injections     Depo      #Migraines  -Migraines are unchanged.  Continues to have a migraine approximately 1-2 brian a week which she treats with Relpax.  -She does state that the migraines have improved since beginning Depo  -She recently had Botox done on her forehead which is also helped.  -Patient has been through several treatment regimens including triptans for treatment, prophylaxis medications have included propranolol, Lamictal, Seroquel all of which have made no changes at this time.  -Patient is waiting to get into neurology for treatment with Botox; however, at this time is here to discuss other possible treatment preventive measures.    #Insomnia:  -Has a longstanding history of insomnia, currently taking Seroquel 75 mg nightly which has been helping.  She used to be on benzodiazepines including Xanax, triazolam.  He states that since stopping the benzodiazepines she feels like her head is much \"clear\" feels like she is able to work and function better without them.  She does get a few less hours sleep because she is not using them but she feels like it is a \"good trade off\".         Allergies   Allergen Reactions   • Ceclor [Cefaclor] Swelling   • Doxycycline      Feels \"hungover\"         Current medicines (including changes today)  Current Outpatient Medications   Medication Sig Dispense Refill   • fluticasone (FLONASE) 50 MCG/ACT nasal spray Spray 2 Sprays in nose every day. 16 g 6   • triazolam (HALCION) 0.25 MG Tab Take 0.5 mg by mouth every day.     • eletriptan (RELPAX) 40 MG tablet TAKE 1 TAB BY MOUTH ONCE AS NEEDED FOR UP TO 1 DOSE. 9 Tab 2   • ALPRAZolam (XANAX) 0.5 MG Tab      • QUEtiapine (SEROQUEL) 25 MG Tab      • lamotrigine (LAMICTAL) 150 MG tablet Take 150 mg by mouth every day.  0   • Tretinoin, Facial Wrinkles, (TRETINOIN, EMOLLIENT,) 0.05 % Cream APPLY TO THIN " "LAYER TO FACE EVERY 2-3 DAYS  3   • minocycline (MINOCIN) 100 MG Cap Take 100 mg by mouth every day.  1     No current facility-administered medications for this visit.      She  has a past medical history of Hypertension, Insomnia, and Migraine.    ROS   Review of Systems   Constitutional: Negative for chills and fever.   Eyes: Negative for blurred vision.   Respiratory: Negative for shortness of breath and wheezing.    Cardiovascular: Negative for chest pain and palpitations.   Gastrointestinal: Negative for abdominal pain, diarrhea, nausea and vomiting.   Skin: Negative for rash.   Neurological: Negative for tingling, tremors, sensory change and speech change.      Objective:     Physical Exam:  /74 (BP Location: Right arm, Patient Position: Sitting, BP Cuff Size: Adult)   Pulse 60   Temp 37.1 °C (98.7 °F) (Temporal)   Resp 12   Ht 1.702 m (5' 7\")   Wt 58.1 kg (128 lb)   SpO2 98%  Body mass index is 20.05 kg/m².   Constitutional: Alert, no distress.  Skin: Warm, dry, good turgor, no rashes in visible areas.  Eye: Equal, round and reactive, conjunctiva clear, lids normal.  ENMT: TM's clear bilaterally, lips without lesions, good dentition, oropharynx clear.  Neck: Trachea midline, no masses, no thyromegaly. No cervical or supraclavicular lymphadenopathy.  Respiratory: Unlabored respiratory effort, lungs clear to auscultation, no wheezes, no rhonchi.  Cardiovascular: Normal S1, S2, no murmur, no edema.  Abdomen: Soft, non-tender, no masses, no hepatosplenomegaly.  Psych: Alert and oriented x3, normal affect and mood.    Assessment and Plan:     1. Migraine without status migrainosus, not intractable, unspecified migraine type  -Status: Stable.  Given treatment failure of other medications and having migraines not controlled to patient satisfaction we will continue the Depo-Provera, Relpax at this time; however, will also begin new medication for migraine control.  We will begin Aimovig monthly.  We will " continue this at this time, follow-up in 3 months.  - Erenumab (AIMOVIG) 70 MG/ML Solution Auto-injector; Inject 1 mL as instructed Once for 1 dose.  Dispense: 1 mL; Refill: 0  - medroxyPROGESTERone (DEPO-PROVERA) injection 150 mg    2. Primary insomnia  Status: Stable.  Applauded patient and her recent endeavor of discontinuing all benzodiazepines.  -We will continue with the Topamax, Seroquel at this time.  Discussed with her again good sleep hygiene.  We will follow-up as needed.    3. Encounter for surveillance of injectable contraceptive  - medroxyPROGESTERone (DEPO-PROVERA) injection 150 mg      Followup: Return in about 3 months (around 9/5/2020).         PLEASE NOTE: This dictation was created using voice recognition software. I have made every reasonable attempt to correct obvious errors, but I expect that there are errors of grammar and possibly content that I did not discover before finalizing the note.

## 2020-06-18 ENCOUNTER — TELEPHONE (OUTPATIENT)
Dept: MEDICAL GROUP | Facility: LAB | Age: 50
End: 2020-06-18

## 2020-06-18 DIAGNOSIS — Z00.00 PREVENTATIVE HEALTH CARE: ICD-10-CM

## 2020-06-18 NOTE — TELEPHONE ENCOUNTER
1. Caller Name: Vanessa Mcdaniel                         Call Back Number:[phone] (home)         How would the patient prefer to be contacted with a response: [employer] message    Does patient need bloodwork before annual in October

## 2020-06-18 NOTE — TELEPHONE ENCOUNTER
DOCUMENTATION OF PAR STATUS:    1. Name of Medication & Dose: Amovig 70mg/ml    [id number]    2. Name of Prescription Coverage Company & phone #: [phone]    3. Date Prior Auth Submitted: 06/18/2020    4. What information was given to obtain insurance decision? Clinical information    5. Prior Auth Status? Pending will be notified in 2 day by fax.     6. Patient Notified: no

## 2020-07-31 ENCOUNTER — OFFICE VISIT (OUTPATIENT)
Dept: URGENT CARE | Facility: CLINIC | Age: 50
End: 2020-07-31
Payer: COMMERCIAL

## 2020-07-31 VITALS
TEMPERATURE: 98.6 F | DIASTOLIC BLOOD PRESSURE: 60 MMHG | WEIGHT: 130 LBS | RESPIRATION RATE: 12 BRPM | OXYGEN SATURATION: 96 % | HEART RATE: 64 BPM | HEIGHT: 67 IN | BODY MASS INDEX: 20.4 KG/M2 | SYSTOLIC BLOOD PRESSURE: 106 MMHG

## 2020-07-31 DIAGNOSIS — J02.9 VIRAL PHARYNGITIS: ICD-10-CM

## 2020-07-31 LAB
INT CON NEG: NORMAL
INT CON POS: NORMAL
S PYO AG THROAT QL: NORMAL

## 2020-07-31 PROCEDURE — 99213 OFFICE O/P EST LOW 20 MIN: CPT | Performed by: FAMILY MEDICINE

## 2020-07-31 PROCEDURE — 87880 STREP A ASSAY W/OPTIC: CPT | Performed by: FAMILY MEDICINE

## 2020-07-31 NOTE — PROGRESS NOTES
"Subjective:     Chief Complaint   Patient presents with   • Sore Throat     x2 days   • Cough               Pharyngitis   This is a new problem. The current episode started in the past 2 days. The problem has been unchanged. There has been no fever. The pain is mild      Associated symptoms include a dry cough . Pertinent negatives include no abdominal pain,  , diarrhea, headaches, shortness of breath or vomiting. no exposure to strep or mono.   has tried acetaminophen for the symptoms. The treatment provided mild relief.         Denies exposure to COVID          Social History     Tobacco Use   • Smoking status: Never Smoker   • Smokeless tobacco: Never Used   Substance Use Topics   • Alcohol use: Yes     Alcohol/week: 1.2 oz     Types: 2 Glasses of wine per week   • Drug use: No       Past Medical History:   Diagnosis Date   • Hypertension    • Insomnia    • Migraine        Review of Systems   Constitutional: Positive for malaise/fatigue. Negative for fever and weight loss.   HENT: Positive for nasal congestion  Respiratory: Negative for   sputum production and shortness of breath.    Cardiovascular: Negative for chest pain.   Gastrointestinal: Negative for nausea, vomiting, abdominal pain and diarrhea.   Genitourinary: Negative.    Neurological: Negative for dizziness and headaches.   All other systems reviewed and are negative.         Objective:   /60   Pulse 64   Temp 37 °C (98.6 °F) (Temporal)   Resp 12   Ht 1.702 m (5' 7\")   Wt 59 kg (130 lb)   SpO2 96%         Physical Exam   Constitutional:   oriented to person, place, and time.  appears well-developed and well-nourished. No distress.   HENT:   Head: Normocephalic and atraumatic.   Right Ear: External ear normal.   Left Ear: External ear normal.   Nose: Mucosal edema present. Right sinus exhibits no maxillary sinus tenderness and no frontal sinus tenderness. Left sinus exhibits no maxillary sinus tenderness and no frontal sinus tenderness. "   Mouth/Throat: no posterior oropharyngeal exudate.   There is posterior oropharyngeal erythema present. No posterior oropharyngeal edema.   Tonsils 1+ bilaterally     Eyes: Conjunctivae and EOM are normal. Pupils are equal, round, and reactive to light. Right eye exhibits no discharge. Left eye exhibits no discharge. No scleral icterus.   Neck: Normal range of motion. Neck supple. No JVD present. No tracheal deviation present. No thyromegaly present.   Cardiovascular: Normal rate, regular rhythm, normal heart sounds and intact distal pulses.  Exam reveals no friction rub.    No murmur heard.  Pulmonary/Chest: Effort normal and breath sounds normal. No respiratory distress.   no wheezes.   no rales.    Musculoskeletal:  exhibits no edema.   Lymphadenopathy:    no cervical LAD  Neurological:   alert and oriented to person, place, and time.   Skin: Skin is warm and dry. No erythema.   Psychiatric:   normal mood and affect.   Nursing note and vitals reviewed.             Assessment/Plan:      1. Viral pharyngitis  rx flonase  Rapid strep negative     Follow up in one week if no improvement, sooner if symptoms worsen.

## 2020-08-07 DIAGNOSIS — G43.909 MIGRAINE WITHOUT STATUS MIGRAINOSUS, NOT INTRACTABLE, UNSPECIFIED MIGRAINE TYPE: ICD-10-CM

## 2020-08-10 RX ORDER — ERENUMAB-AOOE 70 MG/ML
70 INJECTION SUBCUTANEOUS ONCE
Qty: 1 PEN | Refills: 3 | Status: SHIPPED | OUTPATIENT
Start: 2020-08-10 | End: 2020-08-10

## 2020-08-10 NOTE — TELEPHONE ENCOUNTER
Received request via: Pharmacy    Was the patient seen in the last year in this department? Yes LOV 6/5/2020    Does the patient have an active prescription (recently filled or refills available) for medication(s) requested? No

## 2020-08-12 DIAGNOSIS — G43.909 MIGRAINE WITHOUT STATUS MIGRAINOSUS, NOT INTRACTABLE, UNSPECIFIED MIGRAINE TYPE: ICD-10-CM

## 2020-08-12 RX ORDER — PROPRANOLOL HYDROCHLORIDE 80 MG/1
80 TABLET ORAL 2 TIMES DAILY
Qty: 180 TAB | Refills: 1 | Status: SHIPPED | OUTPATIENT
Start: 2020-08-12 | End: 2021-02-17

## 2020-08-12 NOTE — TELEPHONE ENCOUNTER
Received request via: Pharmacy    Was the patient seen in the last year in this department? Yes  6/5/20  Does the patient have an active prescription (recently filled or refills available) for medication(s) requested? No

## 2020-08-24 ENCOUNTER — APPOINTMENT (OUTPATIENT)
Dept: MEDICAL GROUP | Facility: LAB | Age: 50
End: 2020-08-24
Payer: COMMERCIAL

## 2020-08-25 ENCOUNTER — NON-PROVIDER VISIT (OUTPATIENT)
Dept: MEDICAL GROUP | Facility: LAB | Age: 50
End: 2020-08-25
Payer: COMMERCIAL

## 2020-08-25 DIAGNOSIS — G43.909 MIGRAINE WITHOUT STATUS MIGRAINOSUS, NOT INTRACTABLE, UNSPECIFIED MIGRAINE TYPE: ICD-10-CM

## 2020-08-25 DIAGNOSIS — Z30.42 ENCOUNTER FOR SURVEILLANCE OF INJECTABLE CONTRACEPTIVE: ICD-10-CM

## 2020-08-25 PROCEDURE — 96372 THER/PROPH/DIAG INJ SC/IM: CPT | Performed by: FAMILY MEDICINE

## 2020-08-25 RX ORDER — MEDROXYPROGESTERONE ACETATE 150 MG/ML
150 INJECTION, SUSPENSION INTRAMUSCULAR ONCE
Status: COMPLETED | OUTPATIENT
Start: 2020-08-25 | End: 2020-08-25

## 2020-08-25 RX ADMIN — MEDROXYPROGESTERONE ACETATE 150 MG: 150 INJECTION, SUSPENSION INTRAMUSCULAR at 13:49

## 2020-08-25 NOTE — PROGRESS NOTES
Vanessa Mcdaniel is a 50 y.o. here for a Depo Provera Injection.     Date of last Depo Provera Injection: 6/9/2020  Current date within therapeutic range?: Yes   Urine pregnancy test done (needed if out of date range): no  Date of office visit:6/5/2020  Date of last pap (if > 21 years old)/ GYN exam: 1/1/2016  Dx: migraine    Order and dose verified by: df  Patient tolerated injection and no adverse effects were observed or reported: Yes    # of Administrations remaining in MAR: 0  Next injection due between 11/10/2020 and 11/24/2020.

## 2020-08-29 ENCOUNTER — OFFICE VISIT (OUTPATIENT)
Dept: URGENT CARE | Facility: CLINIC | Age: 50
End: 2020-08-29
Payer: COMMERCIAL

## 2020-08-29 VITALS
SYSTOLIC BLOOD PRESSURE: 118 MMHG | TEMPERATURE: 98.8 F | DIASTOLIC BLOOD PRESSURE: 70 MMHG | WEIGHT: 130 LBS | BODY MASS INDEX: 20.4 KG/M2 | HEART RATE: 67 BPM | OXYGEN SATURATION: 98 % | RESPIRATION RATE: 16 BRPM | HEIGHT: 67 IN

## 2020-08-29 DIAGNOSIS — H16.001 ULCER OF RIGHT CORNEA: ICD-10-CM

## 2020-08-29 PROCEDURE — 99213 OFFICE O/P EST LOW 20 MIN: CPT | Performed by: FAMILY MEDICINE

## 2020-08-29 RX ORDER — ERENUMAB-AOOE 70 MG/ML
INJECTION SUBCUTANEOUS
COMMUNITY
Start: 2020-08-10 | End: 2020-12-23

## 2020-08-29 RX ORDER — OFLOXACIN 3 MG/ML
1 SOLUTION/ DROPS OPHTHALMIC EVERY 4 HOURS
Qty: 10 ML | Refills: 0 | Status: SHIPPED | OUTPATIENT
Start: 2020-08-29 | End: 2020-09-05

## 2020-08-29 ASSESSMENT — ENCOUNTER SYMPTOMS
NAUSEA: 0
DOUBLE VISION: 0
WEIGHT LOSS: 0
VOMITING: 0
MYALGIAS: 0

## 2020-08-29 NOTE — PROGRESS NOTES
"Subjective:      Vanessa Mcdaniel is a 50 y.o. female who presents with Redness Or Discharge From Eye (painfull, swelling rt eye x1 day ) and Runny Nose            1 day right eye redness, drainage, and morning mattering.  +contact lens use.  Pain is moderate to severe and worse than prior conjunctivitis.  + Photophobia.  No viral prodrome.  No fever.  Associated rhinorrhea.  No itching.  No home remedies or over-the-counter medications tried.  No foreign body or trauma.  No vision loss.  No other aggravating or alleviating factors.      Review of Systems   Constitutional: Negative for malaise/fatigue and weight loss.   Eyes: Negative for double vision.   Gastrointestinal: Negative for nausea and vomiting.   Musculoskeletal: Negative for joint pain and myalgias.   Skin: Negative for itching and rash.     .  Medications, Allergies, and current problem list reviewed today in Epic       Objective:     /70   Pulse 67   Temp 37.1 °C (98.8 °F) (Temporal)   Resp 16   Ht 1.702 m (5' 7\")   Wt 59 kg (130 lb)   SpO2 98%   BMI 20.36 kg/m²      Physical Exam  Constitutional:       Appearance: Normal appearance.   HENT:      Head: Normocephalic and atraumatic.      Right Ear: Tympanic membrane normal.      Left Ear: Tympanic membrane normal.   Eyes:      Extraocular Movements: Extraocular movements intact.      Pupils: Pupils are equal, round, and reactive to light.     Skin:     General: Skin is warm and dry.   Neurological:      General: No focal deficit present.      Mental Status: She is alert and oriented to person, place, and time.                 Assessment/Plan:         1. Ulcer of right cornea  ofloxacin (OCUFLOX) 0.3 % Solution     Differential diagnosis, natural history, supportive care, and indications for immediate follow-up discussed at length.     Recommend follow-up with her eye professional as soon as possible.  No contact with lens use.  "

## 2020-12-23 RX ORDER — ERENUMAB-AOOE 70 MG/ML
INJECTION SUBCUTANEOUS
Qty: 1.12 ML | Refills: 6 | Status: SHIPPED | OUTPATIENT
Start: 2020-12-23 | End: 2021-09-03

## 2020-12-23 NOTE — TELEPHONE ENCOUNTER
Received request via: Pharmacy    Was the patient seen in the last year in this department? Yes  6/5/2020  Does the patient have an active prescription (recently filled or refills available) for medication(s) requested? No

## 2021-01-29 ENCOUNTER — TELEPHONE (OUTPATIENT)
Dept: MEDICAL GROUP | Facility: LAB | Age: 51
End: 2021-01-29

## 2021-01-29 NOTE — TELEPHONE ENCOUNTER
MEDICATION PRIOR AUTHORIZATION NEEDED:    1. Name of Medication: Aimovig    2. Requested By (Name of Pharmacy): [facility]     3. Is insurance on file current? yes    4. What is the name & phone number of the 3rd party payor? [id number]

## 2021-02-14 DIAGNOSIS — G43.909 MIGRAINE WITHOUT STATUS MIGRAINOSUS, NOT INTRACTABLE, UNSPECIFIED MIGRAINE TYPE: ICD-10-CM

## 2021-02-17 RX ORDER — PROPRANOLOL HYDROCHLORIDE 80 MG/1
80 TABLET ORAL 2 TIMES DAILY
Qty: 180 TABLET | Refills: 0 | Status: SHIPPED | OUTPATIENT
Start: 2021-02-17 | End: 2021-11-05 | Stop reason: SDUPTHER

## 2021-09-03 RX ORDER — ERENUMAB-AOOE 70 MG/ML
INJECTION SUBCUTANEOUS
Qty: 1.12 ML | Refills: 5 | Status: SHIPPED | OUTPATIENT
Start: 2021-09-03 | End: 2021-12-13 | Stop reason: SDUPTHER

## 2021-09-03 NOTE — TELEPHONE ENCOUNTER
Received request via: Pharmacy    Was the patient seen in the last year in this department? NO  LOV:6/5/2020  Does the patient have an active prescription (recently filled or refills available) for medication(s) requested? No

## 2021-11-05 ENCOUNTER — OFFICE VISIT (OUTPATIENT)
Dept: MEDICAL GROUP | Facility: LAB | Age: 51
End: 2021-11-05
Payer: COMMERCIAL

## 2021-11-05 VITALS
HEART RATE: 59 BPM | RESPIRATION RATE: 14 BRPM | OXYGEN SATURATION: 95 % | BODY MASS INDEX: 20.36 KG/M2 | WEIGHT: 130 LBS | SYSTOLIC BLOOD PRESSURE: 122 MMHG | DIASTOLIC BLOOD PRESSURE: 74 MMHG | TEMPERATURE: 97 F

## 2021-11-05 DIAGNOSIS — R23.4 THINNING OF SKIN: ICD-10-CM

## 2021-11-05 DIAGNOSIS — Z88.7 ALLERGY TO VACCINE: ICD-10-CM

## 2021-11-05 DIAGNOSIS — Z00.00 ANNUAL PHYSICAL EXAM: ICD-10-CM

## 2021-11-05 DIAGNOSIS — Z12.11 SCREENING FOR COLORECTAL CANCER: ICD-10-CM

## 2021-11-05 DIAGNOSIS — Z13.6 SCREENING FOR CARDIOVASCULAR CONDITION: ICD-10-CM

## 2021-11-05 DIAGNOSIS — G43.909 MIGRAINE WITHOUT STATUS MIGRAINOSUS, NOT INTRACTABLE, UNSPECIFIED MIGRAINE TYPE: ICD-10-CM

## 2021-11-05 DIAGNOSIS — Z12.12 SCREENING FOR COLORECTAL CANCER: ICD-10-CM

## 2021-11-05 DIAGNOSIS — M25.522 LEFT ELBOW PAIN: ICD-10-CM

## 2021-11-05 PROCEDURE — 99396 PREV VISIT EST AGE 40-64: CPT | Performed by: FAMILY MEDICINE

## 2021-11-05 RX ORDER — PROPRANOLOL HYDROCHLORIDE 80 MG/1
80 TABLET ORAL
Qty: 90 TABLET | Refills: 3 | Status: SHIPPED | OUTPATIENT
Start: 2021-11-05 | End: 2022-02-03

## 2021-11-05 NOTE — PROGRESS NOTES
Subjective:   Vanessa Mcdaniel is a 51 y.o. female here today for   Chief Complaint   Patient presents with   • Arm Injury     Follow up 2 month or so    • Medication Management     Update on amovig    • Coronavirus Screening     Medical exmpt form      #Annual  -Reviewed all past medical history, family history, social history.  -Reviewed all screening/vaccinations: Patient is due for COVID-19 vaccine, influenza, zoster vaccine as well as Pap smear.  Patient also due for colonoscopy.  -Diet and Exercise: Appropriate for age  -Tobacco, alcohol, recreational drug use: No change, see chart  -Sexually active: No change, see chart    #Left elbow pain:  -Approximately 2 months ago patient sustained injury to her elbow which has been causing chronic intermittent pain.  The pain is worse with extreme flexion of the elbow.  Is slightly tender to palpation on the lateral aspect.  The pain is nonradiating, denies any numbness, tingling, weakness or pain in upper extremities.  Here today for further evaluation.    #Thinning of skin:  -Patient has noticed that her skin is become much thinner associate the eyelids, around the eyes, as well as the face.  She denies any other symptoms at this time.  Is using a retinal gel but is not seeing any improvements.  He today for further evaluation.    #Allergy to vaccine:  -Patient states she has remote history of anaphylactic reaction to previous vaccine.  She states that while did not like the hospitalization she does remember becoming very ill.  Because of this patient is leery of starting any new vaccinations before following up with allergist.    #Migraines:  -Chronic condition currently treated with Aimovig, propranolol for prophylactic measures, Relpax for abortive therapy.  Patient states that since starting the Aimovig as well as the propranolol that her headaches are mostly well controlled with no concerns at this time.  Requesting refill of medication.    Allergies   Allergen  "Reactions   • Ceclor [Cefaclor] Swelling   • Doxycycline      Feels \"hungover\"         Current medicines (including changes today)  Current Outpatient Medications   Medication Sig Dispense Refill   • AIMOVIG 70 MG/ML Solution Auto-injector INJECT 1 ML AS INSTRUCTED ONCE FOR 1 DOSE. 1.12 mL 5   • fluticasone (FLONASE) 50 MCG/ACT nasal spray Spray 2 Sprays in nose every day. 16 g 6   • triazolam (HALCION) 0.25 MG Tab Take 0.5 mg by mouth every day.     • eletriptan (RELPAX) 40 MG tablet TAKE 1 TAB BY MOUTH ONCE AS NEEDED FOR UP TO 1 DOSE. 9 Tab 2   • ALPRAZolam (XANAX) 0.5 MG Tab      • QUEtiapine (SEROQUEL) 25 MG Tab      • lamotrigine (LAMICTAL) 150 MG tablet Take 150 mg by mouth every day.  0   • Tretinoin, Facial Wrinkles, (TRETINOIN, EMOLLIENT,) 0.05 % Cream APPLY TO THIN LAYER TO FACE EVERY 2-3 DAYS  3   • minocycline (MINOCIN) 100 MG Cap Take 100 mg by mouth every day.  1     No current facility-administered medications for this visit.     She  has a past medical history of Hypertension, Insomnia, and Migraine.    Social History     Socioeconomic History   • Marital status: Single     Spouse name: Not on file   • Number of children: Not on file   • Years of education: Not on file   • Highest education level: Not on file   Occupational History   • Not on file   Tobacco Use   • Smoking status: Never Smoker   • Smokeless tobacco: Never Used   Vaping Use   • Vaping Use: Never used   Substance and Sexual Activity   • Alcohol use: Yes     Alcohol/week: 1.2 oz     Types: 2 Glasses of wine per week   • Drug use: No   • Sexual activity: Never   Other Topics Concern   • Not on file   Social History Narrative   • Not on file         ROS   Review of Systems   Constitutional: Negative for chills and fever.   HENT: Negative for hearing loss.    Eyes: Negative for blurred vision.   Respiratory: Negative for shortness of breath and wheezing.    Cardiovascular: Negative for chest pain and palpitations.   Gastrointestinal: " Negative for abdominal pain, diarrhea, nausea and vomiting.   Neurological: Negative for dizziness and headaches.   Psychiatric/Behavioral: Negative for depression. The patient is not nervous/anxious.           Objective:     Physical Exam:  /74   Pulse (!) 59   Temp 36.1 °C (97 °F) (Temporal)   Resp 14   Wt 59 kg (130 lb)   SpO2 95%  Body mass index is 20.36 kg/m².   Constitutional: Alert, no distress.  Skin: Warm, dry, good turgor, no rashes in visible areas.  Eye: Equal, round and reactive, conjunctiva clear, lids normal.  ENMT: TM's clear bilaterally, lips without lesions, good dentition, oropharynx clear.  Neck: Trachea midline, no masses, no thyromegaly. No cervical or supraclavicular lymphadenopathy.  Respiratory: Unlabored respiratory effort, lungs clear to auscultation, no wheezes, no rhonchi.  Cardiovascular: Normal S1, S2, no murmur, no edema.  Abdomen: Soft, non-tender, no masses, no hepatosplenomegaly.  Psych: Alert and oriented x3, normal affect and mood.    Const: Vitals above. Well-appearing.  CV: Inspected/palpated for upper extremity edema. Bilateral radial pulses palpated, noting below if not 2+. Capillary refill evaluated distally, noting below if greater than 2 seconds.  Skin: Inspected for rash or skin lesions in area of concern.  Neuro/psych: Reflexes at brachioradialis (C5/6), biceps (C5/6), triceps (C7/8): 2+. Sensation to light touch evaluated at lateral arm (C5), lateral forearm (C6), middle finger (C7), medial forearm (C8), and medial arm (T1). Tinel's test: negative. Observed for normal mood/affect/insight.  MSK: Gait and posture evaluated. Examination of bilateral elbows:  - Inspected/palpated bilaterally for upper extremity carriage and carrying angle, warmth, erythema/discoloration, effusion/swelling, deformity, and tenderness of the supracondylar humerus, medial/lateral epicondyles, olecranon/radial head, and cubital fossa.  - Assessed passive/active range of motion  bilaterally in elbow flexion/extension, forearm pronation/supination, and wrist flexion/extension, noting below for pain or crepitation.  - Assessed muscle strength bilaterally in elbow flexion/extension, forearm pronation/supination, and wrist flexion/extension, noting below if less than 5/5 or pain.  - Assessed stability bilaterally with varus/valgus stress.    All of the above were found to be normal, except: Pain in elbow on flexion both passive and actively.        Assessment and Plan:     1. Annual physical exam  -All questions concerns were answered at this time.  -Vaccinations/screenings needed at this time: We will complete screenings including colonoscopy, labs as below.  -Labs reviewed, no concerns, labs as below.  -Discussed general safety measures including seatbelts, helmets, avoidance of smoking, sunscreen, hydration.  -Follow-up for general physical exam on a yearly basis, sooner if needed.  - CBC WITHOUT DIFFERENTIAL; Future  - Comp Metabolic Panel; Future    2. Migraine without status migrainosus, not intractable, unspecified migraine type  -Status: Stable.  We will continue with medications as listed above.  Will refill propranolol at this time.  Return precautions are given, patient will follow-up as needed.  -Discussed the importance of a healthy, Mediterranean style diet, routine exercise regimen  - propranolol (INDERAL) 80 MG Tab; Take 1 Tablet by mouth at bedtime for 90 days.  Dispense: 90 Tablet; Refill: 3    3. Allergy to vaccine  -At this time patient will forego any other vaccines until further testing is completed.  Refer patient to allergy for further evaluation.  - Referral to Allergy    4. Left elbow pain  -Uncertain etiology of elbow pain.  Given its chronicity as well as its severity we will order MRI of elbow at this time.  Discussed conservative treatment measures.  - MR-ELBOW-W/O LEFT; Future    5. Thinning of skin  -We will evaluate labs to assure no intrinsic causes of thinning  of the skin.  Discussed normal course of aging.  Discussed the possibility of referral to dermatology.  - TSH WITH REFLEX TO FT4; Future  - VITAMIN D,25 HYDROXY; Future    6. Screening for colorectal cancer  - Referral to GI for Colonoscopy    7. Screening for cardiovascular condition  - Lipid Profile; Future    Followup: Return if symptoms worsen or fail to improve.         PLEASE NOTE: This dictation was created using voice recognition software. I have made every reasonable attempt to correct obvious errors, but I expect that there are errors of grammar and possibly content that I did not discover before finalizing the note.

## 2021-11-07 ASSESSMENT — ENCOUNTER SYMPTOMS
PALPITATIONS: 0
DIZZINESS: 0
BLURRED VISION: 0
VOMITING: 0
DEPRESSION: 0
SHORTNESS OF BREATH: 0
HEADACHES: 0
CHILLS: 0
ABDOMINAL PAIN: 0
NERVOUS/ANXIOUS: 0
WHEEZING: 0
FEVER: 0
DIARRHEA: 0
NAUSEA: 0

## 2021-11-16 ENCOUNTER — APPOINTMENT (OUTPATIENT)
Dept: RADIOLOGY | Facility: MEDICAL CENTER | Age: 51
End: 2021-11-16
Attending: FAMILY MEDICINE
Payer: COMMERCIAL

## 2021-11-16 DIAGNOSIS — M25.522 LEFT ELBOW PAIN: ICD-10-CM

## 2021-11-16 PROCEDURE — 73221 MRI JOINT UPR EXTREM W/O DYE: CPT | Mod: LT

## 2021-11-18 ENCOUNTER — PATIENT MESSAGE (OUTPATIENT)
Dept: MEDICAL GROUP | Facility: LAB | Age: 51
End: 2021-11-18

## 2021-11-18 ENCOUNTER — TELEPHONE (OUTPATIENT)
Dept: MEDICAL GROUP | Facility: LAB | Age: 51
End: 2021-11-18

## 2021-11-18 ENCOUNTER — APPOINTMENT (OUTPATIENT)
Dept: MEDICAL GROUP | Facility: LAB | Age: 51
End: 2021-11-18
Payer: COMMERCIAL

## 2021-11-18 DIAGNOSIS — S46.219A BICEPS TENDON TEAR: ICD-10-CM

## 2021-11-18 RX ORDER — SOD SULF/POT CHLORIDE/MAG SULF 1.479 G
TABLET ORAL
COMMUNITY
Start: 2021-11-15 | End: 2024-02-15

## 2021-11-18 NOTE — TELEPHONE ENCOUNTER
1. Caller Name: Vanessa Mcdaniel                          Call Back Number: [phone] (home)         How would the patient prefer to be contacted with a response: Phone call OK to leave a detailed message    2. Patient is requesting imaging - MRI elbow results dated: 11/17/2021    3. Confirmed results are in chart. Patient advised they will be contacted once interpreted by provider.

## 2021-11-19 NOTE — PROGRESS NOTES
Discussed results of MRI with patient.  MRI did show a partial tear of biceps tendon.  Given patient symptoms well findings and MRI do believe that further evaluation with orthopedist as needed.  Referral was placed.  Patient understood agree with current treatment plan will follow-up as needed.

## 2021-11-22 ENCOUNTER — HOSPITAL ENCOUNTER (OUTPATIENT)
Facility: MEDICAL CENTER | Age: 51
End: 2021-11-22
Attending: FAMILY MEDICINE
Payer: COMMERCIAL

## 2021-11-22 ENCOUNTER — OFFICE VISIT (OUTPATIENT)
Dept: MEDICAL GROUP | Facility: LAB | Age: 51
End: 2021-11-22
Payer: COMMERCIAL

## 2021-11-22 VITALS
OXYGEN SATURATION: 99 % | WEIGHT: 127 LBS | SYSTOLIC BLOOD PRESSURE: 120 MMHG | RESPIRATION RATE: 12 BRPM | DIASTOLIC BLOOD PRESSURE: 80 MMHG | HEART RATE: 60 BPM | HEIGHT: 67 IN | BODY MASS INDEX: 19.93 KG/M2 | TEMPERATURE: 97 F

## 2021-11-22 DIAGNOSIS — Z12.4 SCREENING FOR CERVICAL CANCER: ICD-10-CM

## 2021-11-22 PROCEDURE — 99396 PREV VISIT EST AGE 40-64: CPT | Performed by: FAMILY MEDICINE

## 2021-11-22 PROCEDURE — 88175 CYTOPATH C/V AUTO FLUID REDO: CPT

## 2021-11-22 PROCEDURE — 87624 HPV HI-RISK TYP POOLED RSLT: CPT

## 2021-11-22 PROCEDURE — 99000 SPECIMEN HANDLING OFFICE-LAB: CPT | Performed by: FAMILY MEDICINE

## 2021-11-22 ASSESSMENT — PATIENT HEALTH QUESTIONNAIRE - PHQ9: CLINICAL INTERPRETATION OF PHQ2 SCORE: 0

## 2021-11-22 NOTE — PROGRESS NOTES
"Subjective:     Chief Complaint   Patient presents with   • Gynecologic Exam         HPI:   Vanessa presents today for pap smear.   Cant recall any abnormals . No special biopsies,   Was on Depo provera until a year ago.   No periods. No period since  when Depo started.   No menopausal symptoms.   No vaginal discharge, no dryness or pain with intercourse.   Declines mammo.           Current Outpatient Medications Ordered in Epic   Medication Sig Dispense Refill   • SUTAB [id number] MG Tab      • propranolol (INDERAL) 80 MG Tab Take 1 Tablet by mouth at bedtime for 90 days. 90 Tablet 3   • AIMOVIG 70 MG/ML Solution Auto-injector INJECT 1 ML AS INSTRUCTED ONCE FOR 1 DOSE. 1.12 mL 5   • fluticasone (FLONASE) 50 MCG/ACT nasal spray Spray 2 Sprays in nose every day. 16 g 6   • triazolam (HALCION) 0.25 MG Tab Take 0.5 mg by mouth every day.     • eletriptan (RELPAX) 40 MG tablet TAKE 1 TAB BY MOUTH ONCE AS NEEDED FOR UP TO 1 DOSE. 9 Tab 2   • ALPRAZolam (XANAX) 0.5 MG Tab      • QUEtiapine (SEROQUEL) 25 MG Tab      • lamotrigine (LAMICTAL) 150 MG tablet Take 150 mg by mouth every day.  0   • Tretinoin, Facial Wrinkles, (TRETINOIN, EMOLLIENT,) 0.05 % Cream APPLY TO THIN LAYER TO FACE EVERY 2-3 DAYS  3   • minocycline (MINOCIN) 100 MG Cap Take 100 mg by mouth every day.  1     No current [facility]-ordered facility-administered medications on file.         ROS:  Gen: no fevers/chills, no changes in weight  Eyes: no changes in vision  ENT: no sore throat, no hearing loss, no bloody nose  Pulm: no sob, no cough  CV: no chest pain, no palpitations  GI: no nausea/vomiting, no diarrhea  : no dysuria  MSk: no myalgias  Skin: no rash  Neuro: no headaches, no numbness/tingling  Heme/Lymph: no easy bruising      Objective:     Exam:  /80 (BP Location: Right arm, Patient Position: Sitting, BP Cuff Size: Adult)   Pulse 60   Temp 36.1 °C (97 °F)   Resp 12   Ht 1.702 m (5' 7\")   Wt 57.6 kg (127 lb)   SpO2 99%   BMI " 19.89 kg/m²  Body mass index is 19.89 kg/m².    Gen: AAOx3, NAD, well appearing  HEENT: NCAT, EOMI, Nares patent, Mucosa moist  Resp: Normal chest wall rise and fall, not SOB, no tachypnea  Skin: no rash or abnormality of visible skin.   Psych: normal speech, not slurred, good insight, affect full  MSK: Moves all four limbs equally and normally, gait normal  : Normal female genitalia present, cervix is normal pink and healthy.  Pap is collected today.  No discharge present.  Introitus is slightly atrophic.  Bimanual exam did not reveal any masses or tenderness.      Assessment & Plan:     51 y.o. female with the following -     1. Screening for cervical cancer  Pap collected today.  Discussed Pap schedule and also mammogram schedule in addition.  Discussed we will let her know what her Pap is like regardless even if it is normal and that this will give her 5 years with cotesting.  - THINPREP PAP WITH HPV; Future            No follow-ups on file.    Please note that this dictation was created using voice recognition software. I have made every reasonable attempt to correct obvious errors, but I expect that there are errors of grammar and possibly content that I did not discover before finalizing the note.

## 2021-11-23 DIAGNOSIS — Z12.4 SCREENING FOR CERVICAL CANCER: ICD-10-CM

## 2021-11-23 LAB
CYTOLOGY REG CYTOL: NORMAL
HPV HR 12 DNA CVX QL NAA+PROBE: NEGATIVE
HPV16 DNA SPEC QL NAA+PROBE: NEGATIVE
HPV18 DNA SPEC QL NAA+PROBE: NEGATIVE
SPECIMEN SOURCE: NORMAL

## 2021-12-13 RX ORDER — ERENUMAB-AOOE 70 MG/ML
INJECTION SUBCUTANEOUS
Qty: 1.12 ML | Refills: 5 | Status: SHIPPED | OUTPATIENT
Start: 2021-12-13 | End: 2022-02-17 | Stop reason: SDUPTHER

## 2022-02-14 ENCOUNTER — TELEPHONE (OUTPATIENT)
Dept: MEDICAL GROUP | Facility: LAB | Age: 52
End: 2022-02-14
Payer: COMMERCIAL

## 2022-02-14 NOTE — TELEPHONE ENCOUNTER
MEDICATION PRIOR AUTHORIZATION NEEDED:    1. Name of Medication: aimovig 70mg     2. [facility]ed [facility] ([facility]     3. Is insurance on file current? Yes     4. What is the name & phone number of the 3rd party payor? [employer]      (Key: [id number])

## 2022-02-17 RX ORDER — ERENUMAB-AOOE 70 MG/ML
INJECTION SUBCUTANEOUS
Qty: 1.12 ML | Refills: 5 | Status: SHIPPED | OUTPATIENT
Start: 2022-02-17 | End: 2022-03-14

## 2022-08-11 ENCOUNTER — PATIENT MESSAGE (OUTPATIENT)
Dept: MEDICAL GROUP | Facility: LAB | Age: 52
End: 2022-08-11
Payer: COMMERCIAL

## 2022-09-07 RX ORDER — ERENUMAB-AOOE 140 MG/ML
INJECTION, SOLUTION SUBCUTANEOUS
Qty: 1 ML | Refills: 11 | Status: SHIPPED | OUTPATIENT
Start: 2022-09-07 | End: 2023-07-20 | Stop reason: SDUPTHER

## 2022-10-14 RX ORDER — PROPRANOLOL HYDROCHLORIDE 80 MG/1
TABLET ORAL
Qty: 90 TABLET | Refills: 0 | Status: SHIPPED | OUTPATIENT
Start: 2022-10-14 | End: 2023-01-09

## 2023-01-09 RX ORDER — PROPRANOLOL HYDROCHLORIDE 80 MG/1
TABLET ORAL
Qty: 30 TABLET | Refills: 0 | Status: SHIPPED | OUTPATIENT
Start: 2023-01-09 | End: 2023-02-07

## 2023-01-09 NOTE — TELEPHONE ENCOUNTER
Received request via: Pharmacy    Was the patient seen in the last year in this department? No    Does the patient have an active prescription (recently filled or refills available) for medication(s) requested? No    Does the patient have correction Plus and need 100 day supply (blood pressure, diabetes and cholesterol meds only)? Patient does not have SCP.    PROPRANOLOL 80 MG TABLET

## 2023-02-03 ENCOUNTER — TELEPHONE (OUTPATIENT)
Dept: MEDICAL GROUP | Facility: LAB | Age: 53
End: 2023-02-03
Payer: COMMERCIAL

## 2023-02-03 NOTE — TELEPHONE ENCOUNTER
MEDICATION PRIOR AUTHORIZATION NEEDED:    1. Name of Medication: Aimovig    2. Requested By ([facility]     3. Is insurance on file current? yes    4. What is the name & phone number of the 3rd party payor? [id number]

## 2023-07-20 RX ORDER — ERENUMAB-AOOE 140 MG/ML
INJECTION, SOLUTION SUBCUTANEOUS
Qty: 1 ML | Refills: 0 | Status: SHIPPED | OUTPATIENT
Start: 2023-07-20 | End: 2023-08-23

## 2023-07-26 ENCOUNTER — TELEPHONE (OUTPATIENT)
Dept: MEDICAL GROUP | Facility: LAB | Age: 53
End: 2023-07-26
Payer: COMMERCIAL

## 2023-07-26 NOTE — TELEPHONE ENCOUNTER
MEDICATION PRIOR AUTHORIZATION NEEDED:    1. Name of Medication: Aimovig 140mg/ml     2. Requested By (Name of Pharmacy): [facility]     3. Is insurance on file current? Yes -See in2apps message regarding new insurance    4. What is the name & phone number of the 3rd party pay[facility]? [facility]       Key# [id number]

## 2023-08-08 NOTE — TELEPHONE ENCOUNTER
Received request via: Pharmacy    Was the patient seen in the last year in this department? No    Does the patient have an active prescription (recently filled or refills available) for medication(s) requested? No    Does the patient have long-term Plus and need 100 day supply (blood pressure, diabetes and cholesterol meds only)? Patient does not have SCP    Last office visit 11/22/21  Last labs 10/01/2009

## 2023-08-09 RX ORDER — PROPRANOLOL HYDROCHLORIDE 80 MG/1
TABLET ORAL
Qty: 90 TABLET | Refills: 0 | Status: SHIPPED | OUTPATIENT
Start: 2023-08-09 | End: 2023-11-10

## 2023-11-09 NOTE — TELEPHONE ENCOUNTER
Received request via: Pharmacy    Was the patient seen in the last year in this department? No  LOV : 11/22/2021   Does the patient have an active prescription (recently filled or refills available) for medication(s) requested? No    Does the patient have [facility] Plus and need 100 day supply (blood pressure, diabetes and cholesterol meds only)? Patient does not have SCP

## 2023-11-10 RX ORDER — PROPRANOLOL HYDROCHLORIDE 80 MG/1
TABLET ORAL
Qty: 90 TABLET | Refills: 0 | Status: SHIPPED | OUTPATIENT
Start: 2023-11-10 | End: 2024-02-15 | Stop reason: SDUPTHER

## 2023-11-16 RX ORDER — ERENUMAB-AOOE 140 MG/ML
INJECTION, SOLUTION SUBCUTANEOUS
Qty: 1 ML | Refills: 6 | Status: SHIPPED | OUTPATIENT
Start: 2023-11-16 | End: 2024-02-15 | Stop reason: SDUPTHER

## 2023-11-16 NOTE — TELEPHONE ENCOUNTER
Received request via: Pharmacy    Was the patient seen in the last year in this department? NO LOV 11/22/2021 Message sent to patient to schedule appt.    Does the patient have an active prescription (recently filled or refills available) for medication(s) requested? No    Does the patient have [employer] Plus and need 100 day supply (blood pressure, diabetes and cholesterol meds only)? Patient does not have SCP

## 2024-02-15 ENCOUNTER — OFFICE VISIT (OUTPATIENT)
Dept: MEDICAL GROUP | Facility: LAB | Age: 54
End: 2024-02-15
Payer: COMMERCIAL

## 2024-02-15 VITALS
BODY MASS INDEX: 20.72 KG/M2 | WEIGHT: 132 LBS | OXYGEN SATURATION: 99 % | SYSTOLIC BLOOD PRESSURE: 104 MMHG | DIASTOLIC BLOOD PRESSURE: 74 MMHG | HEIGHT: 67 IN | HEART RATE: 90 BPM | TEMPERATURE: 98.3 F | RESPIRATION RATE: 16 BRPM

## 2024-02-15 DIAGNOSIS — Z11.59 NEED FOR HEPATITIS C SCREENING TEST: ICD-10-CM

## 2024-02-15 DIAGNOSIS — G43.709 CHRONIC MIGRAINE WITHOUT AURA WITHOUT STATUS MIGRAINOSUS, NOT INTRACTABLE: ICD-10-CM

## 2024-02-15 DIAGNOSIS — F51.01 PRIMARY INSOMNIA: ICD-10-CM

## 2024-02-15 DIAGNOSIS — Z12.12 SCREENING FOR COLORECTAL CANCER: ICD-10-CM

## 2024-02-15 DIAGNOSIS — Z12.11 SCREENING FOR COLORECTAL CANCER: ICD-10-CM

## 2024-02-15 DIAGNOSIS — Z13.6 SCREENING FOR CARDIOVASCULAR CONDITION: ICD-10-CM

## 2024-02-15 DIAGNOSIS — Z11.4 SCREENING FOR HIV (HUMAN IMMUNODEFICIENCY VIRUS): ICD-10-CM

## 2024-02-15 DIAGNOSIS — Z00.00 ANNUAL PHYSICAL EXAM: ICD-10-CM

## 2024-02-15 PROCEDURE — 3074F SYST BP LT 130 MM HG: CPT | Performed by: FAMILY MEDICINE

## 2024-02-15 PROCEDURE — 3078F DIAST BP <80 MM HG: CPT | Performed by: FAMILY MEDICINE

## 2024-02-15 PROCEDURE — 99396 PREV VISIT EST AGE 40-64: CPT | Performed by: FAMILY MEDICINE

## 2024-02-15 RX ORDER — PROPRANOLOL HYDROCHLORIDE 80 MG/1
80 TABLET ORAL
Qty: 90 TABLET | Refills: 3 | Status: SHIPPED | OUTPATIENT
Start: 2024-02-15 | End: 2025-02-09

## 2024-02-15 RX ORDER — QUETIAPINE FUMARATE 100 MG/1
TABLET, FILM COATED ORAL
COMMUNITY
Start: 2023-12-20

## 2024-02-15 RX ORDER — ERENUMAB-AOOE 140 MG/ML
INJECTION, SOLUTION SUBCUTANEOUS
Qty: 1 ML | Refills: 11 | Status: SHIPPED | OUTPATIENT
Start: 2024-02-15

## 2024-02-15 ASSESSMENT — PATIENT HEALTH QUESTIONNAIRE - PHQ9: CLINICAL INTERPRETATION OF PHQ2 SCORE: 0

## 2024-02-16 ENCOUNTER — APPOINTMENT (OUTPATIENT)
Dept: MEDICAL GROUP | Facility: LAB | Age: 54
End: 2024-02-16
Payer: COMMERCIAL

## 2024-02-16 NOTE — PROGRESS NOTES
"Verbal consent was acquired by the patient to use [employer] ambient listening note generation during this visit Yes    Subjective:   Vanessa Mcdaniel is a 53 y.o. female here today for   Chief Complaint   Patient presents with    Medication Refill    Annual Exam       History of Present Illness  The patient presents for an annual exam.    His migraines are under control since increasing the dose of Aimovig. She denies any side effects such as lightheadedness, dizziness, abdominal pain, nausea, or vomiting with Aimovig. She has been off Aimovig for 2 months. She is on propranolol once a day.    Longstanding history of migraines have been difficult to treat.  Previously started on lamotrigine.  she weaned himself off lamotrigine since the weekend and wonders if he should be feeling any side effects. She last took the lamotrigine on Saturday. She was taking Calm Nighttime Gummies, so dhe has been taking 1 gummy to make sure she does not have any breakthrough sleep issues, which might be making her a little bit cloudy. She is still on quetiapine at night, which is working well for him. She thinks she has been having a little bit of anxiety since stopping the lamotrigine.    She thinks he has gained 5 pounds, but she has not lost over Anika. She denies any new sexual partners or concerns for STD screening. She sees a dentist regularly. She denies any fevers, chills, chest pain, or shortness of breath. She has lost a little bit of hearing in one of his ears, but no big changes. Her vision is not good, but she goes to a doctor.   She drinks alcohol a couple of times a week. She denies any tobacco or drug use.   She has a family history of bad cholesterol.      Allergies   Allergen Reactions    Ceclor [Cefaclor] Swelling    Doxycycline      Feels \"hungover\"         Current medicines (including changes today)  Current Outpatient Medications   Medication Sig Dispense Refill    QUEtiapine (SEROQUEL) 100 MG Tab TAKE 1 AND " "1/2 TO 2 TABLETS BY MOUTH EVERYDAY AT BEDTIME      Erenumab-aooe (AIMOVIG) 140 MG/ML Solution Auto-injector INJECT 1 ML SUBCUTANEOUSLY EVERY 30 DAYS 1 mL 6    propranolol (INDERAL) 80 MG Tab TAKE 1 TABLET BY MOUTH EVERYDAY AT BEDTIME 90 Tablet 0    fluticasone (FLONASE) 50 MCG/ACT nasal spray Spray 2 Sprays in nose every day. 16 g 6    Tretinoin, Facial Wrinkles, (TRETINOIN, EMOLLIENT,) 0.05 % Cream APPLY TO THIN LAYER TO FACE EVERY 2-3 DAYS  3    triazolam (HALCION) 0.25 MG Tab Take 0.5 mg by mouth every day.      eletriptan (RELPAX) 40 MG tablet TAKE 1 TAB BY MOUTH ONCE AS NEEDED FOR UP TO 1 DOSE. 9 Tab 2     No current facility-administered medications for this visit.     She  has a past medical history of Hypertension, Insomnia, and Migraine.    ROS   -See HPI     Objective:     Physical Exam:  /74   Pulse 90   Temp 36.8 °C (98.3 °F) (Temporal)   Resp 16   Ht 1.702 m (5' 7.01\")   Wt 59.9 kg (132 lb)   SpO2 99%  Body mass index is 20.67 kg/m².   Constitutional: Alert, no distress.  Skin: Warm, dry, good turgor, no rashes in visible areas.  Eye: Equal, round and reactive, conjunctiva clear, lids normal.  ENMT: TM's clear bilaterally, lips without lesions, good dentition, oropharynx clear.  Neck: Trachea midline, no masses, no thyromegaly. No cervical or supraclavicular lymphadenopathy.  Respiratory: Unlabored respiratory effort, lungs clear to auscultation, no wheezes, no rhonchi.  Cardiovascular: Normal S1, S2, no murmur, no edema.  Abdomen: Soft, non-tender, no masses, no hepatosplenomegaly.  Psych: Alert and oriented x3, normal affect and mood.    Results      Assessment and Plan:     Assessment & Plan  1. Migraines.  Chronic condition, stable with no concerns.  I will call back in the Aimovig. I will call back in the propranolol.     2. Health maintenance.  She declines to receive influenza vaccine today. I will get some fasting blood work done. I will place an order for a colonoscopy.    3.  " Insomnia  She can continue to take the Seroquel. If she has any concerns at night, she will let me know.    Orders:  1. Annual physical exam  - CBC WITHOUT DIFFERENTIAL; Future  - Comp Metabolic Panel; Future    2. Chronic migraine without aura without status migrainosus, not intractable  - propranolol (INDERAL) 80 MG Tab; Take 1 Tablet by mouth at bedtime for 360 days.  Dispense: 90 Tablet; Refill: 3  - Erenumab-aooe (AIMOVIG) 140 MG/ML Solution Auto-injector; INJECT 1 ML SUBCUTANEOUSLY EVERY 30 DAYS  Dispense: 1 mL; Refill: 11    3. Primary insomnia    4. Screening for cardiovascular condition  - Lipid Profile; Future    5. Need for hepatitis C screening test  - HEP C VIRUS ANTIBODY; Future    6. Screening for HIV (human immunodeficiency virus)  - HIV AG/AB COMBO ASSAY SCREENING; Future    7. Screening for colorectal cancer  - Referral to GI for Colonoscopy      Followup: No follow-ups on file.         PLEASE NOTE: This dictation was created using voice recognition software. I have made every reasonable attempt to correct obvious errors, but I expect that there are errors of grammar and possibly content that I did not discover before finalizing the note.

## 2024-04-09 ENCOUNTER — PATIENT MESSAGE (OUTPATIENT)
Dept: MEDICAL GROUP | Facility: LAB | Age: 54
End: 2024-04-09
Payer: COMMERCIAL

## 2024-04-09 DIAGNOSIS — G43.909 MIGRAINE WITHOUT STATUS MIGRAINOSUS, NOT INTRACTABLE, UNSPECIFIED MIGRAINE TYPE: ICD-10-CM

## 2024-04-09 RX ORDER — ELETRIPTAN HYDROBROMIDE 40 MG/1
TABLET, FILM COATED ORAL
Qty: 9 TABLET | Refills: 0 | Status: SHIPPED | OUTPATIENT
Start: 2024-04-09

## 2024-06-11 DIAGNOSIS — G43.909 MIGRAINE WITHOUT STATUS MIGRAINOSUS, NOT INTRACTABLE, UNSPECIFIED MIGRAINE TYPE: ICD-10-CM

## 2024-06-11 NOTE — TELEPHONE ENCOUNTER
Received request via: Pharmacy    Was the patient seen in the last year in this department? Yes    Does the patient have an active prescription (recently filled or refills available) for medication(s) requested? No    [facility] [address]    Does the patient have residential Plus and need 100 day supply (blood pressure, diabetes and cholesterol meds only)? Patient does not have SCP

## 2024-06-12 RX ORDER — ELETRIPTAN HYDROBROMIDE 40 MG/1
TABLET, FILM COATED ORAL
Qty: 9 TABLET | Refills: 1 | Status: SHIPPED | OUTPATIENT
Start: 2024-06-12

## 2024-07-08 ENCOUNTER — OFFICE VISIT (OUTPATIENT)
Dept: URGENT CARE | Facility: CLINIC | Age: 54
End: 2024-07-08
Payer: COMMERCIAL

## 2024-07-08 ENCOUNTER — HOSPITAL ENCOUNTER (OUTPATIENT)
Facility: MEDICAL CENTER | Age: 54
End: 2024-07-08
Attending: PHYSICIAN ASSISTANT
Payer: COMMERCIAL

## 2024-07-08 VITALS
WEIGHT: 132 LBS | RESPIRATION RATE: 16 BRPM | HEIGHT: 67 IN | TEMPERATURE: 97.6 F | OXYGEN SATURATION: 95 % | DIASTOLIC BLOOD PRESSURE: 62 MMHG | HEART RATE: 72 BPM | BODY MASS INDEX: 20.72 KG/M2 | SYSTOLIC BLOOD PRESSURE: 104 MMHG

## 2024-07-08 DIAGNOSIS — N30.00 ACUTE CYSTITIS WITHOUT HEMATURIA: ICD-10-CM

## 2024-07-08 LAB
APPEARANCE UR: CLEAR
BILIRUB UR STRIP-MCNC: NEGATIVE MG/DL
COLOR UR AUTO: NORMAL
GLUCOSE UR STRIP.AUTO-MCNC: NEGATIVE MG/DL
KETONES UR STRIP.AUTO-MCNC: NEGATIVE MG/DL
LEUKOCYTE ESTERASE UR QL STRIP.AUTO: NORMAL
NITRITE UR QL STRIP.AUTO: NEGATIVE
PH UR STRIP.AUTO: 6.5 [PH] (ref 5–8)
PROT UR QL STRIP: NEGATIVE MG/DL
RBC UR QL AUTO: NORMAL
SP GR UR STRIP.AUTO: 1
UROBILINOGEN UR STRIP-MCNC: 0.2 MG/DL

## 2024-07-08 PROCEDURE — 81002 URINALYSIS NONAUTO W/O SCOPE: CPT | Performed by: PHYSICIAN ASSISTANT

## 2024-07-08 PROCEDURE — 87186 SC STD MICRODIL/AGAR DIL: CPT

## 2024-07-08 PROCEDURE — 3078F DIAST BP <80 MM HG: CPT | Performed by: PHYSICIAN ASSISTANT

## 2024-07-08 PROCEDURE — 99214 OFFICE O/P EST MOD 30 MIN: CPT | Performed by: PHYSICIAN ASSISTANT

## 2024-07-08 PROCEDURE — 87086 URINE CULTURE/COLONY COUNT: CPT

## 2024-07-08 PROCEDURE — 87077 CULTURE AEROBIC IDENTIFY: CPT

## 2024-07-08 PROCEDURE — 3074F SYST BP LT 130 MM HG: CPT | Performed by: PHYSICIAN ASSISTANT

## 2024-07-08 RX ORDER — NITROFURANTOIN 25; 75 MG/1; MG/1
100 CAPSULE ORAL 2 TIMES DAILY
Qty: 10 CAPSULE | Refills: 0 | Status: SHIPPED | OUTPATIENT
Start: 2024-07-08 | End: 2024-07-13

## 2024-07-08 RX ORDER — LAMOTRIGINE 150 MG/1
150 TABLET ORAL DAILY
COMMUNITY
Start: 2024-01-16

## 2024-07-08 RX ORDER — ALPRAZOLAM 0.5 MG/1
1 TABLET ORAL 2 TIMES DAILY PRN
COMMUNITY
Start: 2024-06-04

## 2024-07-08 ASSESSMENT — ENCOUNTER SYMPTOMS
FLANK PAIN: 0
CHILLS: 0
FEVER: 0

## 2024-07-11 LAB
BACTERIA UR CULT: ABNORMAL
BACTERIA UR CULT: ABNORMAL
SIGNIFICANT IND 70042: ABNORMAL
SITE SITE: ABNORMAL
SOURCE SOURCE: ABNORMAL

## 2024-09-16 DIAGNOSIS — G43.709 CHRONIC MIGRAINE WITHOUT AURA WITHOUT STATUS MIGRAINOSUS, NOT INTRACTABLE: ICD-10-CM

## 2024-09-16 RX ORDER — ERENUMAB-AOOE 140 MG/ML
INJECTION, SOLUTION SUBCUTANEOUS
Qty: 1 ML | Refills: 11 | Status: SHIPPED | OUTPATIENT
Start: 2024-09-16 | End: 2025-09-11

## 2024-09-16 NOTE — TELEPHONE ENCOUNTER
Vanessa Mcdaniel (Key: [id number])  PA Case ID #: [id number]  Need Help? Call us at [phone]  Outcome  Approved today by MashON 2017  PA Case: [id number], Status: Approved, Coverage Starts on: 9/16/2024 12:00:00 AM, Coverage Ends on: 9/16/2025 12:00:00 AM.

## 2025-02-12 DIAGNOSIS — G43.709 CHRONIC MIGRAINE WITHOUT AURA WITHOUT STATUS MIGRAINOSUS, NOT INTRACTABLE: ICD-10-CM

## 2025-02-12 RX ORDER — PROPRANOLOL HYDROCHLORIDE 80 MG/1
80 TABLET ORAL
Qty: 90 TABLET | Refills: 0 | Status: SHIPPED | OUTPATIENT
Start: 2025-02-12

## 2025-02-12 NOTE — TELEPHONE ENCOUNTER
Received request via: Pharmacy    Was the patient seen in the last year in this department? Yes    Does the patient have an active prescription (recently filled or refills available) for medication(s) requested? No    [facility]    Does the patient have [facility] Plus and need 100-day supply? (This applies to ALL medications) Patient does not have SCP.

## 2025-05-08 DIAGNOSIS — G43.709 CHRONIC MIGRAINE WITHOUT AURA WITHOUT STATUS MIGRAINOSUS, NOT INTRACTABLE: ICD-10-CM

## 2025-05-09 RX ORDER — PROPRANOLOL HYDROCHLORIDE 80 MG/1
80 TABLET ORAL
Qty: 90 TABLET | Refills: 0 | Status: SHIPPED | OUTPATIENT
Start: 2025-05-09

## 2025-06-16 ENCOUNTER — APPOINTMENT (OUTPATIENT)
Dept: RADIOLOGY | Facility: IMAGING CENTER | Age: 55
End: 2025-06-16
Attending: PHYSICIAN ASSISTANT
Payer: COMMERCIAL

## 2025-06-16 ENCOUNTER — OFFICE VISIT (OUTPATIENT)
Dept: URGENT CARE | Facility: CLINIC | Age: 55
End: 2025-06-16
Payer: COMMERCIAL

## 2025-06-16 VITALS
RESPIRATION RATE: 16 BRPM | SYSTOLIC BLOOD PRESSURE: 116 MMHG | WEIGHT: 132 LBS | HEIGHT: 67 IN | OXYGEN SATURATION: 98 % | HEART RATE: 77 BPM | TEMPERATURE: 97.8 F | DIASTOLIC BLOOD PRESSURE: 72 MMHG | BODY MASS INDEX: 20.72 KG/M2

## 2025-06-16 DIAGNOSIS — M79.641 RIGHT HAND PAIN: ICD-10-CM

## 2025-06-16 DIAGNOSIS — S63.649A: ICD-10-CM

## 2025-06-16 PROCEDURE — 3074F SYST BP LT 130 MM HG: CPT | Performed by: PHYSICIAN ASSISTANT

## 2025-06-16 PROCEDURE — 73140 X-RAY EXAM OF FINGER(S): CPT | Mod: TC,RT | Performed by: RADIOLOGY

## 2025-06-16 PROCEDURE — 3078F DIAST BP <80 MM HG: CPT | Performed by: PHYSICIAN ASSISTANT

## 2025-06-16 PROCEDURE — 99214 OFFICE O/P EST MOD 30 MIN: CPT | Performed by: PHYSICIAN ASSISTANT

## 2025-06-16 NOTE — Clinical Note
REFERRAL APPROVAL NOTICE         Sent on June 16, 2025                   Vanessa Mcdaniel  [address] [place] [address]                   Dear Ms. Mcdaniel,    After a careful review of the medical information and benefit coverage, [facility] has processed your referral. See below for additional details.    If applicable, you must be actively enrolled with your insurance for coverage of the authorized service. If you have any questions regarding your coverage, please contact your insurance directly.    REFERRAL INFORMATION   Referral #:  [id number]  Referred-To Department    Referred-By Provider:  Hand Surgery    Abram Damon P.A.-C.   [facility]      [address]  [address]  [facility] [id number] [phone] [address] [facility] [address]  [phone]    Referral Start Date:  06/16/2025  Referral End Date:   06/16/2026             SCHEDULING  If you do not already have an appointment, please call [phone] to make an appointment.     MORE INFORMATION  If you do not already have a [facility] account, sign up at: [email or web address]  You can access your medical information, make appointments, see lab results, billing information, and more.  If you have questions regarding this referral, please contact  the [facility] Referrals department at:             [phone]. Monday - Friday 8:00AM - 5:00PM.     Sincerely,    [facility]

## 2025-06-16 NOTE — PROGRESS NOTES
"  Subjective:   Vanessa Mcdaniel is a 55 y.o. female who presents today with   Chief Complaint   Patient presents with    Other     Patient coming in for R thumb sprain x weeks no recall on how it was sprain        Other    Patient states she thinks she may have injured the thumb but does not remember specifically how she did it.  She states she does lots of paddle boarding but the thumb was painful before that.      PMH:  has a past medical history of Hypertension, Insomnia, and Migraine.  MEDS: Current Medications[1]  ALLERGIES: Allergies[2]  SURGHX: Past Surgical History[3]  SOCHX:  reports that she has never smoked. She has never used smokeless tobacco. She reports current alcohol use of about 1.2 oz of alcohol per week. She reports that she does not use drugs.  FH: Reviewed with patient, not pertinent to this visit.     Review of Systems   Musculoskeletal:         Right thumb pain        Objective:   /72   Pulse 77   Temp 36.6 °C (97.8 °F)   Resp 16   Ht 1.702 m (5' 7\")   Wt 59.9 kg (132 lb)   SpO2 98%   BMI 20.67 kg/m²   Physical Exam  Vitals and nursing note reviewed.   Constitutional:       General: She is not in acute distress.     Appearance: Normal appearance. She is well-developed. She is not ill-appearing or toxic-appearing.   HENT:      Head: Normocephalic and atraumatic.      Right Ear: Hearing normal.      Left Ear: Hearing normal.   Cardiovascular:      Rate and Rhythm: Normal rate.   Pulmonary:      Effort: Pulmonary effort is normal.   Musculoskeletal:      Comments: Ulnar laxity noted of the right thumb.  Tenderness to palpation near the metacarpal phalangeal joint space.  No anatomical snuffbox tenderness.  Negative Finkelstein test.  Neurovascular intact distally.  Less than 2 cap refill.  Mild swelling noted to the MCP joint.  No bruising or other deformity   Skin:     General: Skin is warm and dry.   Neurological:      Mental Status: She is alert.      Coordination: Coordination " normal.   Psychiatric:         Mood and Affect: Mood normal.       DX THUMB  FINDINGS:     There is normal bony mineralization.  There is no evidence of fracture, dislocation, or osseous lesion.  There is no evidence of soft tissue injury.     IMPRESSION:        1.  No radiographic evidence of acute injury.    Assessment/Plan:   Assessment    1. Right hand pain    2. Sprain of ulnar collateral ligament of metacarpophalangeal (MCP) joint of thumb, initial encounter  - Referral to Hand Surgery      Concern of possible torn ulnar collateral ligament.  Suggest thumb spica splint at this time and follow-up with hand specialist.    Differential diagnosis, natural history, supportive care, and indications for immediate follow-up discussed.   Patient given instructions and understanding of medications and treatment.    If not improving in 3-5 days, F/U with PCP or return to  if symptoms worsen.    Patient agreeable to plan.    Please note that this dictation was created using voice recognition software. I have made every reasonable attempt to correct obvious errors, but I expect that there are errors of grammar and possibly content that I did not discover before finalizing the note.    Abram Damon PA-C         [1]   Current Outpatient Medications:     propranolol (INDERAL) 80 MG Tab, TAKE 1 TABLET BY MOUTH AT BEDTIME. APPOINTMENT NEEDED PRIOR TO FURTHER REFILLS., Disp: 90 Tablet, Rfl: 0    Erenumab-aooe (AIMOVIG) 140 MG/ML Solution Auto-injector, INJECT 1 ML SUBCUTANEOUSLY EVERY 30 DAYS, Disp: 1 mL, Rfl: 11    ALPRAZolam (XANAX) 0.5 MG Tab, Take 1 Tablet by mouth 2 times a day as needed., Disp: , Rfl:     lamotrigine (LAMICTAL) 150 MG tablet, Take 150 mg by mouth every day., Disp: , Rfl:     eletriptan (RELPAX) 40 MG tablet, TAKE 1 TAB BY MOUTH DAILY AS NEEDED FOR HEADACHE, Disp: 9 Tablet, Rfl: 1    QUEtiapine (SEROQUEL) 100 MG Tab, TAKE 1 AND 1/2 TO 2 TABLETS BY MOUTH EVERYDAY AT BEDTIME, Disp: , Rfl:     fluticasone  "(FLONASE) 50 MCG/ACT nasal spray, Spray 2 Sprays in nose every day., Disp: 16 g, Rfl: 6    triazolam (HALCION) 0.25 MG Tab, Take 0.5 mg by mouth every day., Disp: , Rfl:     Tretinoin, Facial Wrinkles, (TRETINOIN, EMOLLIENT,) 0.05 % Cream, APPLY TO THIN LAYER TO FACE EVERY 2-3 DAYS, Disp: , Rfl: 3  [2]   Allergies  Allergen Reactions    Ceclor [Cefaclor] Swelling    Doxycycline      Feels \"hungover\"   [3]   Past Surgical History:  Procedure Laterality Date    MAMMOPLASTY AUGMENTATION Bilateral     REPAIR, KNEE, ACL Right      "

## 2025-08-09 DIAGNOSIS — G43.709 CHRONIC MIGRAINE WITHOUT AURA WITHOUT STATUS MIGRAINOSUS, NOT INTRACTABLE: ICD-10-CM

## 2025-08-11 RX ORDER — PROPRANOLOL HYDROCHLORIDE 80 MG/1
80 TABLET ORAL
Qty: 30 TABLET | Refills: 0 | Status: SHIPPED | OUTPATIENT
Start: 2025-08-11

## 2025-09-08 ENCOUNTER — APPOINTMENT (OUTPATIENT)
Dept: MEDICAL GROUP | Facility: LAB | Age: 55
End: 2025-09-08
Payer: COMMERCIAL